# Patient Record
Sex: FEMALE | Race: WHITE | NOT HISPANIC OR LATINO | Employment: FULL TIME | URBAN - METROPOLITAN AREA
[De-identification: names, ages, dates, MRNs, and addresses within clinical notes are randomized per-mention and may not be internally consistent; named-entity substitution may affect disease eponyms.]

---

## 2017-03-08 ENCOUNTER — OFFICE VISIT (OUTPATIENT)
Dept: FAMILY MEDICINE | Facility: CLINIC | Age: 57
End: 2017-03-08
Payer: COMMERCIAL

## 2017-03-08 ENCOUNTER — TELEPHONE (OUTPATIENT)
Dept: FAMILY MEDICINE | Facility: CLINIC | Age: 57
End: 2017-03-08

## 2017-03-08 ENCOUNTER — HOSPITAL ENCOUNTER (OUTPATIENT)
Dept: ULTRASOUND IMAGING | Facility: CLINIC | Age: 57
Discharge: HOME | End: 2017-03-08
Attending: FAMILY MEDICINE | Admitting: FAMILY MEDICINE
Payer: COMMERCIAL

## 2017-03-08 VITALS
TEMPERATURE: 97.6 F | HEIGHT: 67 IN | DIASTOLIC BLOOD PRESSURE: 71 MMHG | WEIGHT: 166 LBS | HEART RATE: 58 BPM | BODY MASS INDEX: 26.06 KG/M2 | SYSTOLIC BLOOD PRESSURE: 125 MMHG

## 2017-03-08 DIAGNOSIS — R10.11 RUQ ABDOMINAL PAIN: ICD-10-CM

## 2017-03-08 DIAGNOSIS — R10.11 RUQ ABDOMINAL PAIN: Primary | ICD-10-CM

## 2017-03-08 LAB
ALBUMIN SERPL-MCNC: 3.8 G/DL (ref 3.4–5)
ALP SERPL-CCNC: 60 U/L (ref 40–150)
ALT SERPL W P-5'-P-CCNC: 36 U/L (ref 0–50)
ANION GAP SERPL CALCULATED.3IONS-SCNC: 6 MMOL/L (ref 3–14)
AST SERPL W P-5'-P-CCNC: 22 U/L (ref 0–45)
BILIRUB SERPL-MCNC: 0.3 MG/DL (ref 0.2–1.3)
BUN SERPL-MCNC: 13 MG/DL (ref 7–30)
CALCIUM SERPL-MCNC: 8.7 MG/DL (ref 8.5–10.1)
CHLORIDE SERPL-SCNC: 104 MMOL/L (ref 94–109)
CO2 SERPL-SCNC: 29 MMOL/L (ref 20–32)
CREAT SERPL-MCNC: 0.63 MG/DL (ref 0.52–1.04)
ERYTHROCYTE [DISTWIDTH] IN BLOOD BY AUTOMATED COUNT: 12.3 % (ref 10–15)
GFR SERPL CREATININE-BSD FRML MDRD: NORMAL ML/MIN/1.7M2
GLUCOSE SERPL-MCNC: 85 MG/DL (ref 70–99)
HCT VFR BLD AUTO: 39.9 % (ref 35–47)
HGB BLD-MCNC: 13.6 G/DL (ref 11.7–15.7)
LIPASE SERPL-CCNC: 173 U/L (ref 73–393)
MCH RBC QN AUTO: 31.1 PG (ref 26.5–33)
MCHC RBC AUTO-ENTMCNC: 34.1 G/DL (ref 31.5–36.5)
MCV RBC AUTO: 91 FL (ref 78–100)
PLATELET # BLD AUTO: 264 10E9/L (ref 150–450)
POTASSIUM SERPL-SCNC: 3.8 MMOL/L (ref 3.4–5.3)
PROT SERPL-MCNC: 7.2 G/DL (ref 6.8–8.8)
RBC # BLD AUTO: 4.37 10E12/L (ref 3.8–5.2)
SODIUM SERPL-SCNC: 139 MMOL/L (ref 133–144)
WBC # BLD AUTO: 8.5 10E9/L (ref 4–11)

## 2017-03-08 PROCEDURE — 85027 COMPLETE CBC AUTOMATED: CPT | Performed by: FAMILY MEDICINE

## 2017-03-08 PROCEDURE — 36415 COLL VENOUS BLD VENIPUNCTURE: CPT | Performed by: FAMILY MEDICINE

## 2017-03-08 PROCEDURE — 83690 ASSAY OF LIPASE: CPT | Performed by: FAMILY MEDICINE

## 2017-03-08 PROCEDURE — 76705 ECHO EXAM OF ABDOMEN: CPT

## 2017-03-08 PROCEDURE — 80053 COMPREHEN METABOLIC PANEL: CPT | Performed by: FAMILY MEDICINE

## 2017-03-08 PROCEDURE — 99214 OFFICE O/P EST MOD 30 MIN: CPT | Performed by: FAMILY MEDICINE

## 2017-03-08 NOTE — PROGRESS NOTES
SUBJECTIVE:                                                    Hannah Moreno is a 56 year old female who presents to clinic today for the following health issues:      Abdominal Pain      Duration: Over one week, about 8 days.     Description (location/character/radiation): Right side abdominal pain, as a dull ache.  It will radiate to the mid abdominal area.  Pain did wake her up at 3:00 am today. It is tender.        Associated flank pain: None    Intensity:  Moderate, 5/10.  Yesterday morning it was 8/10.    Accompanying signs and symptoms:        Fever/Chills: no        Gas/Bloating: YES- Gas and bloating.       Nausea/vomitting: Has felt nauseated.  No vomiting.       Diarrhea: no; the BM's are normal.        Dysuria or Hematuria: no     History (previous similar pain/trauma/previous testing): None    Precipitating or alleviating factors:       Pain worse with eating/BM/urination: An hour to hour and half after eating.  Not worse with a BM or urinating.  Symptoms can be worse with sitting or laying down flat.         Pain relieved by BM: no     Therapies tried and outcome: Apple cider vinegar, Probiotic-increased this, Essential oils, Rolaids. Nothing is working.  Apple cider vinegar seemed to help a little. Bowel movements did not help this.     LMP:  not applicable-hysterectomy.         Current Outpatient Prescriptions:      NEW MED, St Johns Wort-taking daily., Disp: , Rfl:      Cholecalciferol (VITAMIN D3 PO), Take 400 Units by mouth daily, Disp: , Rfl:      atenolol (TENORMIN) 25 MG tablet, Take 0.5 tablets (12.5 mg) by mouth daily Hold on file until needed, Disp: 90 tablet, Rfl: 1     Probiotic Product (PROBIOTIC DAILY PO), Take 1 capsule by mouth 2 times daily., Disp: , Rfl:      Omega-3 Fatty Acids (FISH OIL TRIPLE STRENGTH PO), Take 1 capsule by mouth daily., Disp: , Rfl:      MULTIVITAMIN OR, 2 tabs daily, Disp: , Rfl:     Patient Active Problem List   Diagnosis     Hearing loss     Tachycardia      "Essential hypertension, benign     Persistent disorder of initiating or maintaining sleep     Heat intolerance     CARDIOVASCULAR SCREENING; LDL GOAL LESS THAN 160     S/P ablation operation for arrhythmia     Obesity       Blood pressure 125/71, pulse 58, temperature 97.6  F (36.4  C), temperature source Tympanic, height 5' 7\" (1.702 m), weight 166 lb (75.3 kg), not currently breastfeeding.    Exam:  GENERAL APPEARANCE: healthy, alert and no distress  EYES: EOMI,  PERRL  HENT: ear canals and TM's normal and nose and mouth without ulcers or lesions  NECK: no adenopathy, no asymmetry, masses, or scars and thyroid normal to palpation  RESP: lungs clear to auscultation - no rales, rhonchi or wheezes  CV: regular rates and rhythm, normal S1 S2, no S3 or S4 and no murmur, click or rub -  ABDOMEN: tender only in the RUQ; Villeda's sign is positive. The rest of the abdomen and the bladder and CVA and flanks are not tender.   No masses felt.   SKIN: no suspicious lesions or rashes  PSYCH: mentation appears normal and affect normal/bright      (R10.11) RUQ abdominal pain  (primary encounter diagnosis)  Comment:   Plan: NEW MED, US Abdomen Limited, **CBC with         platelets FUTURE anytime, **Comprehensive         metabolic panel FUTURE anytime, Lipase, GENERAL        SURG ADULT REFERRAL        We discussed the likely cause of the gallstones and use the no fat diet to prevent pain. Do the Ultrasound of the right upper abdomen soon and call [phone].   Do the labs today and we will lashonda the results. The referral to Surgery is done and call [phone] if there are gallstones. If the US is normal then we will order the gallbladder scan. If that is normal then the CT scan of the abdomen with oral and IV contrast and call our RN at [phone] to order this.     Rodney Sheriff              "

## 2017-03-08 NOTE — TELEPHONE ENCOUNTER
Hida scan is placed as per written request.  I have notified the patient to call DI and she has the number. Sharri VALADEZ RN

## 2017-03-08 NOTE — TELEPHONE ENCOUNTER
Reason for Call: Request for an order or referral:    Order or referral being requested: CT or Gallbladder scan      Date needed: as soon as possible    Has the patient been seen by the PCP for this problem? YES    Additional comments: pt is calling stating she was told that if her labs were normal then a Gallbladder scan or CT would be ordered     Phone number Patient can be reached at:  Home number on file [phone] (home)    Best Time:  Any     Can we leave a detailed message on this number?  YES    Call taken on 3/8/2017 at 2:25 PM by Keya Buchanan

## 2017-03-08 NOTE — NURSING NOTE
"Chief Complaint   Patient presents with     Abdominal Pain     Right side abdominal pain for over one week.       Initial /71  Pulse 58  Temp 97.6  F (36.4  C) (Tympanic)  Ht 5' 7\" (1.702 m)  Wt 166 lb (75.3 kg)  BMI 26 kg/m2 Estimated body mass index is 26 kg/(m^2) as calculated from the following:    Height as of this encounter: 5' 7\" (1.702 m).    Weight as of this encounter: 166 lb (75.3 kg).  Medication Reconciliation: complete  "

## 2017-03-08 NOTE — MR AVS SNAPSHOT
After Visit Summary   3/8/2017    Hannah Moreno    MRN: [id number]           Patient Information     Date Of Birth          1960        Visit Information        Provider Department      3/8/2017 8:40 AM Rodney Sheriff MD [facility]        Today's Diagnoses     RUQ abdominal pain    -  1      Care Instructions          Thank you for choosing [facility].  You may be receiving a survey in the mail from [facility] regarding your visit today.  Please take a few minutes to complete and return the survey to let us know how we are doing.      If you have questions or concerns, please contact us via Evrent or you can contact your care team at [phone].    Our Clinic hours are:  Monday 6:40 am  to 7:00 pm  Tuesday -Friday 6:40 am to 5:00 pm    The [place] outpatient lab hours are:  Monday - Friday 6:10 am to 4:45 pm  Saturdays 7:00 am to 11:00 am  Appointments are required, call [phone]    If you have clinical questions after hours or would like to schedule an appointment,  call the clinic at [phone].    (R10.11) RUQ abdominal pain  (primary encounter diagnosis)  Comment:   Plan: NEW MED, US Abdomen Limited, **CBC with         platelets FUTURE anytime, **Comprehensive         metabolic panel FUTURE anytime, Lipase, GENERAL        SURG ADULT REFERRAL        We discussed the likely cause of the gallstones and use the no fat diet to prevent pain. Do the Ultrasound of the right upper abdomen soon and call [phone].   Do the labs today and we will lashonda the results. The referral to Surgery is done and call [phone] if there are gallstones. If the US is normal then we will order the gallbladder scan. If that is normal then the CT scan of the abdomen with oral and IV contrast and call our RN at [phone] to order this.         Follow-ups after your visit        Additional Services     GENERAL SURG ADULT REFERRAL       Your provider has referred you to: YISSEL: Osito Vo  [facility] [phone]   [email or web address]    Please be aware that coverage of these services is subject to the terms and limitations of your health insurance plan.  Call member services at your health plan with any benefit or coverage questions.      Please bring the following with you to your appointment:    (1) Any X-Rays, CTs or MRIs which have been performed.  Contact the facility where they were done to arrange for  prior to your scheduled appointment.   (2) List of current medications   (3) This referral request   (4) Any documents/labs given to you for this referral                  Future tests that were ordered for you today     Open Future Orders        Priority Expected Expires Ordered    **CBC with platelets FUTURE anytime Routine 3/8/2017 3/8/2018 3/8/2017    **Comprehensive metabolic panel FUTURE anytime Routine 3/8/2017 3/8/2018 3/8/2017    US Abdomen Limited Routine  3/8/2018 3/8/2017            Who to contact     If you have questions or need follow up information about today's clinic visit or your schedule please contact [facility] directly at [phone].  Normal or non-critical lab and imaging results will be communicated to you by MyChart, letter or phone within 4 business days after the clinic has received the results. If you do not hear from us within 7 days, please contact the clinic through MyChart or phone. If you have a critical or abnormal lab result, we will notify you by phone as soon as possible.  Submit refill requests through [employer] or call your pharmacy and they will forward the refill request to us. Please allow 3 business days for your refill to be completed.          Additional Information About Your Visit        MyChart Information     [employer] gives you secure access to your electronic health record. If you see a primary care provider, you can also send messages to your care team and make appointments. If you have  "questions, please call your primary care clinic.  If you do not have a primary care provider, please call [phone] and they will assist you.        Care EveryWhere ID     This is your Care EveryWhere ID. This could be used by other organizations to access your [place] medical records  [id number]        Your Vitals Were     Pulse Temperature Height BMI (Body Mass Index)          58 97.6  F (36.4  C) (Tympanic) 5' 7\" (1.702 m) 26 kg/m2         Blood Pressure from Last 3 Encounters:   03/08/17 125/71   08/29/16 108/80   11/11/15 104/64    Weight from Last 3 Encounters:   03/08/17 166 lb (75.3 kg)   08/29/16 179 lb 9.6 oz (81.5 kg)   11/11/15 228 lb (103.4 kg)              We Performed the Following     GENERAL SURG ADULT REFERRAL     Lipase        Primary Care Provider Office Phone # Fax #    Donnell Buchanan [phone]       [facility] [address] [facility] [address]        Thank you!     Thank you for choosing [facility]  for your care. Our goal is always to provide you with excellent care. Hearing back from our patients is one way we can continue to improve our services. Please take a few minutes to complete the written survey that you may receive in the mail after your visit with us. Thank you!             Your Updated Medication List - Protect others around you: Learn how to safely use, store and throw away your medicines at www.disposemymeds.org.          This list is accurate as of: 3/8/17  9:20 AM.  Always use your most recent med list.                   Brand Name Dispense Instructions for use    atenolol 25 MG tablet    TENORMIN    90 tablet    Take 0.5 tablets (12.5 mg) by mouth daily Hold on file until needed       FISH OIL TRIPLE STRENGTH PO      Take 1 capsule by mouth daily.       MULTIVITAMIN PO      2 tabs daily       NEW MED      St Johns Wort-taking daily.       PROBIOTIC DAILY PO      Take 1 capsule by mouth 2 times daily.       VITAMIN D3 " PO      Take 400 Units by mouth daily

## 2017-03-08 NOTE — PATIENT INSTRUCTIONS
Thank you for choosing [facility].  You may be receiving a survey in the mail from [facility] regarding your visit today.  Please take a few minutes to complete and return the survey to let us know how we are doing.      If you have questions or concerns, please contact us via [employer] or you can contact your care team at [phone].    Our Clinic hours are:  Monday 6:40 am  to 7:00 pm  Tuesday -Friday 6:40 am to 5:00 pm    The [place] outpatient lab hours are:  Monday - Friday 6:10 am to 4:45 pm  Saturdays 7:00 am to 11:00 am  Appointments are required, call [phone]    If you have clinical questions after hours or would like to schedule an appointment,  call the clinic at [phone].    (R10.11) RUQ abdominal pain  (primary encounter diagnosis)  Comment:   Plan: NEW MED, US Abdomen Limited, **CBC with         platelets FUTURE anytime, **Comprehensive         metabolic panel FUTURE anytime, Lipase, GENERAL        SURG ADULT REFERRAL        We discussed the likely cause of the gallstones and use the no fat diet to prevent pain. Do the Ultrasound of the right upper abdomen soon and call [phone].   Do the labs today and we will lashonda the results. The referral to Surgery is done and call [phone] if there are gallstones. If the US is normal then we will order the gallbladder scan. If that is normal then the CT scan of the abdomen with oral and IV contrast and call our RN at [phone] to order this.

## 2017-03-10 ENCOUNTER — HOSPITAL ENCOUNTER (OUTPATIENT)
Dept: NUCLEAR MEDICINE | Facility: CLINIC | Age: 57
Setting detail: NUCLEAR MEDICINE
Discharge: HOME | End: 2017-03-10
Attending: FAMILY MEDICINE | Admitting: FAMILY MEDICINE
Payer: COMMERCIAL

## 2017-03-10 DIAGNOSIS — R10.11 RUQ ABDOMINAL PAIN: ICD-10-CM

## 2017-03-10 PROCEDURE — A9537 TC99M MEBROFENIN: HCPCS | Performed by: FAMILY MEDICINE

## 2017-03-10 PROCEDURE — 78227 HEPATOBIL SYST IMAGE W/DRUG: CPT

## 2017-03-10 PROCEDURE — 34300033 ZZH RX 343: Performed by: FAMILY MEDICINE

## 2017-03-10 RX ORDER — KIT FOR THE PREPARATION OF TECHNETIUM TC 99M MEBROFENIN 45 MG/10ML
4.8-7.2 INJECTION, POWDER, LYOPHILIZED, FOR SOLUTION INTRAVENOUS ONCE
Status: COMPLETED | OUTPATIENT
Start: 2017-03-10 | End: 2017-03-10

## 2017-03-10 RX ADMIN — MEBROFENIN 5 MCI.: 45 INJECTION, POWDER, LYOPHILIZED, FOR SOLUTION INTRAVENOUS at 10:04

## 2017-03-13 ENCOUNTER — MYC MEDICAL ADVICE (OUTPATIENT)
Dept: FAMILY MEDICINE | Facility: CLINIC | Age: 57
End: 2017-03-13

## 2017-03-13 ENCOUNTER — HOSPITAL ENCOUNTER (OUTPATIENT)
Dept: CT IMAGING | Facility: CLINIC | Age: 57
Discharge: HOME | End: 2017-03-13
Attending: FAMILY MEDICINE | Admitting: FAMILY MEDICINE
Payer: COMMERCIAL

## 2017-03-13 ENCOUNTER — TELEPHONE (OUTPATIENT)
Dept: FAMILY MEDICINE | Facility: CLINIC | Age: 57
End: 2017-03-13

## 2017-03-13 DIAGNOSIS — R10.11 ABDOMINAL PAIN, RIGHT UPPER QUADRANT: Primary | ICD-10-CM

## 2017-03-13 DIAGNOSIS — R10.11 ABDOMINAL PAIN, RIGHT UPPER QUADRANT: ICD-10-CM

## 2017-03-13 PROCEDURE — 25000125 ZZHC RX 250: Performed by: RADIOLOGY

## 2017-03-13 PROCEDURE — 74177 CT ABD & PELVIS W/CONTRAST: CPT

## 2017-03-13 PROCEDURE — 25500064 ZZH RX 255 OP 636: Performed by: RADIOLOGY

## 2017-03-13 RX ORDER — IOPAMIDOL 755 MG/ML
81 INJECTION, SOLUTION INTRAVASCULAR ONCE
Status: COMPLETED | OUTPATIENT
Start: 2017-03-13 | End: 2017-03-13

## 2017-03-13 RX ADMIN — IOPAMIDOL 81 ML: 755 INJECTION, SOLUTION INTRAVENOUS at 15:52

## 2017-03-13 RX ADMIN — SODIUM CHLORIDE 60 ML: 9 INJECTION, SOLUTION INTRAVENOUS at 15:51

## 2017-03-13 NOTE — TELEPHONE ENCOUNTER
Patient reports her gallbladder scan was negative, she wants to know if CT scan is next.  Please Advise.    Order pended.    Routing to provider.    Alejandrina WELCH Rn

## 2017-03-13 NOTE — TELEPHONE ENCOUNTER
Reason for Call:  Other call back    Detailed comments: She had a gallbladder scan done today.  That was negative.  She is wondering if she is suppose to have a CT Scan done now?  Please advise.    Phone Number Patient can be reached at: Home number on file [phone] (home)    Best Time: any    Can we leave a detailed message on this number? YES    Call taken on 3/13/2017 at 11:25 AM by Noemi Farley

## 2017-03-13 NOTE — TELEPHONE ENCOUNTER
Contacted patient via phone.  Notified her of Dr. Sheriff's order for CT and gave her number for Imaging to schedule her appt.  Telephone encounter made.    Alejandrina WELCH Rn

## 2017-03-13 NOTE — TELEPHONE ENCOUNTER
Please see message below.  Advise.    Order pended.    Routing to provider.    Alejandrina WELCH Rn

## 2017-03-15 ENCOUNTER — MYC MEDICAL ADVICE (OUTPATIENT)
Dept: FAMILY MEDICINE | Facility: CLINIC | Age: 57
End: 2017-03-15

## 2017-03-15 DIAGNOSIS — R10.11 ABDOMINAL PAIN, RIGHT UPPER QUADRANT: Primary | ICD-10-CM

## 2017-03-15 NOTE — TELEPHONE ENCOUNTER
These are ordered. Please help her get the stool container.   She should make the lab appt. We will call the results.   ]Rodney Sheriff

## 2017-03-15 NOTE — TELEPHONE ENCOUNTER
Dr. Sheriff- patient is requesting to  Have the H. Pylori test completed here before she goes to see the GI specialist. Are you ok ordering this? She is also requesting to complete the Hep C screening at that time    Ofelia Trejo RN

## 2017-04-07 DIAGNOSIS — I10 ESSENTIAL HYPERTENSION, BENIGN: ICD-10-CM

## 2017-04-07 DIAGNOSIS — R00.0 TACHYCARDIA, UNSPECIFIED: ICD-10-CM

## 2017-04-07 NOTE — TELEPHONE ENCOUNTER
Atenolol      Last Written Prescription Date: 08/29/16  Last Fill Quantity: 90, # refills: 1    Last Office Visit with [facility], [facility] or [facility] prescribing provider:  03/08/17   Future Office Visit:        BP Readings from Last 3 Encounters:   03/08/17 125/71   08/29/16 108/80   11/11/15 104/64

## 2017-04-11 RX ORDER — ATENOLOL 25 MG/1
TABLET ORAL
Qty: 90 TABLET | Refills: 1 | Status: SHIPPED | OUTPATIENT
Start: 2017-04-11 | End: 2017-05-30 | Stop reason: DRUGHIGH

## 2017-05-30 ENCOUNTER — OFFICE VISIT (OUTPATIENT)
Dept: FAMILY MEDICINE | Facility: CLINIC | Age: 57
End: 2017-05-30
Payer: COMMERCIAL

## 2017-05-30 VITALS
DIASTOLIC BLOOD PRESSURE: 76 MMHG | BODY MASS INDEX: 27.94 KG/M2 | SYSTOLIC BLOOD PRESSURE: 114 MMHG | HEIGHT: 67 IN | HEART RATE: 60 BPM | WEIGHT: 178 LBS | TEMPERATURE: 97.8 F

## 2017-05-30 DIAGNOSIS — Z11.59 NEED FOR HEPATITIS C SCREENING TEST: ICD-10-CM

## 2017-05-30 DIAGNOSIS — L82.0 INFLAMED SEBORRHEIC KERATOSIS: Primary | ICD-10-CM

## 2017-05-30 LAB — HCV AB SERPL QL IA: NORMAL

## 2017-05-30 PROCEDURE — 36415 COLL VENOUS BLD VENIPUNCTURE: CPT | Performed by: FAMILY MEDICINE

## 2017-05-30 PROCEDURE — 86803 HEPATITIS C AB TEST: CPT | Performed by: FAMILY MEDICINE

## 2017-05-30 PROCEDURE — 99213 OFFICE O/P EST LOW 20 MIN: CPT | Performed by: FAMILY MEDICINE

## 2017-05-30 NOTE — PATIENT INSTRUCTIONS
Please go to lab.    You have seborrheic keratosis on your abdomen.    They are benign.    If they bother you I would treat them with liquid nitrogen.          Thank you for choosing [facility].  You may be receiving a survey in the mail from [facility] regarding your visit today.  Please take a few minutes to complete and return the survey to let us know how we are doing.      If you have questions or concerns, please contact us via [employer] or you can contact your care team at [phone].    Our Clinic hours are:  Monday 6:40 am  to 7:00 pm  Tuesday -Friday 6:40 am to 5:00 pm    The [place] outpatient lab hours are:  Monday - Friday 6:10 am to 4:45 pm  Saturdays 7:00 am to 11:00 am  Appointments are required, call [phone]    If you have clinical questions after hours or would like to schedule an appointment,  call the clinic at [phone].

## 2017-05-30 NOTE — PROGRESS NOTES
"  SUBJECTIVE:                                                    Hannah Moreno is a 56 year old female who presents to clinic today for the following health issues:  Chief Complaint   Patient presents with     Mole     2 moles on her abdomin that have changed color and texture. Also has a mole on her left arm that has gotten larger.     The moles have changed color from brown, black and now gray.  She has similar one that she has scrapped off.  Has h/o SK on her back.        Problem list and histories reviewed & adjusted, as indicated.  Additional history: as documented        Reviewed and updated as needed this visit by clinical staff  Tobacco  Allergies  Meds  Med Hx  Surg Hx  Fam Hx  Soc Hx      Reviewed and updated as needed this visit by Provider         ROS:  CONSTITUTIONAL:NEGATIVE for fever, chills, change in weight  INTEGUMENTARY/SKIN: as above    OBJECTIVE:                                                    /76 (Cuff Size: Adult Large)  Pulse 60  Temp 97.8  F (36.6  C) (Tympanic)  Ht 5' 7\" (1.702 m)  Wt 178 lb (80.7 kg)  BMI 27.88 kg/m2  Body mass index is 27.88 kg/(m^2).  GENERAL APPEARANCE: healthy, alert and no distress  SKIN: plaque - abdomen, two that are about 3 mm diameter and 4 mm diameter, brown gray in color, c/w SK.  Mild irritation at waist line of abdomen         ASSESSMENT/PLAN:                                                    1. Inflamed seborrheic keratosis  Explained what they are and treatment if she would like them done    2. Need for hepatitis C screening test  Needs to have this screened  - Hepatitis C antibody    Counseling/Coordination of care is over 10 min in 15 min appt.    See Patient Instructions    Donnell Buchanan MD  [facility]  "

## 2017-05-30 NOTE — NURSING NOTE
"Chief Complaint   Patient presents with     Mole     2 moles on her abdomin that have changed color and texture. Also has a mole on her left arm that has gotten larger.       Initial /76 (Cuff Size: Adult Large)  Pulse 60  Temp 97.8  F (36.6  C) (Tympanic)  Ht 5' 7\" (1.702 m)  Wt 178 lb (80.7 kg)  BMI 27.88 kg/m2 Estimated body mass index is 27.88 kg/(m^2) as calculated from the following:    Height as of this encounter: 5' 7\" (1.702 m).    Weight as of this encounter: 178 lb (80.7 kg).  Medication Reconciliation: mariola Simpson, AR-C (AAMA)  "

## 2017-05-30 NOTE — MR AVS SNAPSHOT
After Visit Summary   5/30/2017    Hannah Moreno    MRN: [id number]           Patient Information     Date Of Birth          1960        Visit Information        Provider Department      5/30/2017 8:20 AM Donnell Buchanan MD [facility]        Today's Diagnoses     Inflamed seborrheic keratosis    -  1    Need for hepatitis C screening test          Care Instructions    Please go to lab.    You have seborrheic keratosis on your abdomen.    They are benign.    If they bother you I would treat them with liquid nitrogen.          Thank you for choosing [facility].  You may be receiving a survey in the mail from [facility] regarding your visit today.  Please take a few minutes to complete and return the survey to let us know how we are doing.      If you have questions or concerns, please contact us via [employer] or you can contact your care team at [phone].    Our Clinic hours are:  Monday 6:40 am  to 7:00 pm  Tuesday -Friday 6:40 am to 5:00 pm    The [place] outpatient lab hours are:  Monday - Friday 6:10 am to 4:45 pm  Saturdays 7:00 am to 11:00 am  Appointments are required, call [phone]    If you have clinical questions after hours or would like to schedule an appointment,  call the clinic at [phone].              Follow-ups after your visit        Who to contact     If you have questions or need follow up information about today's clinic visit or your schedule please contact [facility] directly at [phone].  Normal or non-critical lab and imaging results will be communicated to you by [employer], letter or phone within 4 business days after the clinic has received the results. If you do not hear from us within 7 days, please contact the clinic through Emp[employer] or phone. If you have a critical or abnormal lab result, we will notify you by phone as soon as possible.  Submit refill requests through [employer] or call your pharmacy and they will  "forward the refill request to us. Please allow 3 business days for your refill to be completed.          Additional Information About Your Visit        [employer] Information     [employer] gives you secure access to your electronic health record. If you see a primary care provider, you can also send messages to your care team and make appointments. If you have questions, please call your primary care clinic.  If you do not have a primary care provider, please call [phone] and they will assist you.        Care EveryWhere ID     This is your Care EveryWhere ID. This could be used by other organizations to access your [place] medical records  [id number]        Your Vitals Were     Pulse Temperature Height BMI (Body Mass Index)          60 97.8  F (36.6  C) (Tympanic) 5' 7\" (1.702 m) 27.88 kg/m2         Blood Pressure from Last 3 Encounters:   05/30/17 114/76   03/08/17 125/71   08/29/16 108/80    Weight from Last 3 Encounters:   05/30/17 178 lb (80.7 kg)   03/08/17 166 lb (75.3 kg)   08/29/16 179 lb 9.6 oz (81.5 kg)              We Performed the Following     Hepatitis C antibody          Today's Medication Changes          These changes are accurate as of: 5/30/17  8:53 AM.  If you have any questions, ask your nurse or doctor.               These medicines have changed or have updated prescriptions.        Dose/Directions    atenolol 25 MG tablet   Commonly known as:  TENORMIN   This may have changed:  Another medication with the same name was removed. Continue taking this medication, and follow the directions you see here.   Used for:  Tachycardia, unspecified, Essential hypertension, benign   Changed by:  Donnell Buchanan MD        Dose:  12.5 mg   Take 0.5 tablets (12.5 mg) by mouth daily Hold on file until needed   Quantity:  90 tablet   Refills:  1                Primary Care Provider Office Phone # Fax #    Donnell Buchanan [phone]       [facility] [address] [facility] [address]        Thank you!     Thank you for choosing [facility]  for your care. Our goal is always to provide you with excellent care. Hearing back from our patients is one way we can continue to improve our services. Please take a few minutes to complete the written survey that you may receive in the mail after your visit with us. Thank you!             Your Updated Medication List - Protect others around you: Learn how to safely use, store and throw away your medicines at www.disposemymeds.org.          This list is accurate as of: 5/30/17  8:53 AM.  Always use your most recent med list.                   Brand Name Dispense Instructions for use    atenolol 25 MG tablet    TENORMIN    90 tablet    Take 0.5 tablets (12.5 mg) by mouth daily Hold on file until needed       FISH OIL TRIPLE STRENGTH PO      Take 1 capsule by mouth daily.       MULTIVITAMIN PO      2 tabs daily       NEW MED      St Johns Wort-taking daily.       PROBIOTIC DAILY PO      Take 1 capsule by mouth 2 times daily.       Turmeric Curcumin Caps      Take 1 tablet by mouth daily       VITAMIN D3 PO      Take 400 Units by mouth daily

## 2017-08-10 ENCOUNTER — OFFICE VISIT (OUTPATIENT)
Dept: FAMILY MEDICINE | Facility: CLINIC | Age: 57
End: 2017-08-10
Payer: COMMERCIAL

## 2017-08-10 VITALS
DIASTOLIC BLOOD PRESSURE: 65 MMHG | RESPIRATION RATE: 15 BRPM | HEART RATE: 78 BPM | TEMPERATURE: 96.9 F | BODY MASS INDEX: 29.13 KG/M2 | WEIGHT: 186 LBS | OXYGEN SATURATION: 98 % | SYSTOLIC BLOOD PRESSURE: 123 MMHG

## 2017-08-10 DIAGNOSIS — S46.811A STRAIN OF TRAPEZIUS MUSCLE, RIGHT, INITIAL ENCOUNTER: Primary | ICD-10-CM

## 2017-08-10 DIAGNOSIS — M77.8 SHOULDER TENDINITIS, RIGHT: ICD-10-CM

## 2017-08-10 PROCEDURE — 99213 OFFICE O/P EST LOW 20 MIN: CPT | Performed by: FAMILY MEDICINE

## 2017-08-10 RX ORDER — CYCLOBENZAPRINE HCL 5 MG
TABLET ORAL
Qty: 90 TABLET | Refills: 0 | Status: SHIPPED | OUTPATIENT
Start: 2017-08-10 | End: 2018-07-27

## 2017-08-10 NOTE — LETTER
[facility]  [address] [facility] [address]  Phone: [phone]    August 10, 2017        Hannah Moreno  [address]  [place] [address]          To whom it may concern:    RE: Hannah Moreno    Patient was seen and treated today at our clinic.  Recommend left handed work mostly until re-evaluated with frequent breaks.   Recommend workman's comp evaluation.     Please contact me for questions or concerns.      Sincerely,        Nereida Galo MD

## 2017-08-10 NOTE — MR AVS SNAPSHOT
After Visit Summary   8/10/2017    Hannah Moreno    MRN: [id number]           Patient Information     Date Of Birth          1960        Visit Information        Provider Department      8/10/2017 3:20 PM Nereida Galo MD [facility]        Today's Diagnoses     Strain of trapezius muscle, right, initial encounter    -  1    Shoulder tendinitis, right           Follow-ups after your visit        Who to contact     If you have questions or need follow up information about today's clinic visit or your schedule please contact [facility] directly at [phone].  Normal or non-critical lab and imaging results will be communicated to you by [employer], letter or phone within 4 business days after the clinic has received the results. If you do not hear from us within 7 days, please contact the clinic through [email or web address] or phone. If you have a critical or abnormal lab result, we will notify you by phone as soon as possible.  Submit refill requests through InHiro or call your pharmacy and they will forward the refill request to us. Please allow 3 business days for your refill to be completed.          Additional Information About Your Visit        MyChart Information     InHiro gives you secure access to your electronic health record. If you see a primary care provider, you can also send messages to your care team and make appointments. If you have questions, please call your primary care clinic.  If you do not have a primary care provider, please call [phone] and they will assist you.        Care EveryWhere ID     This is your Care EveryWhere ID. This could be used by other organizations to access your [place] medical records  [id number]        Your Vitals Were     Pulse Temperature Respirations Pulse Oximetry Breastfeeding? BMI (Body Mass Index)    78 96.9  F (36.1  C) (Oral) 15 98% No 29.13 kg/m2       Blood Pressure from Last 3 Encounters:   08/10/17 123/65    05/30/17 114/76   03/08/17 125/71    Weight from Last 3 Encounters:   08/10/17 186 lb (84.4 kg)   05/30/17 178 lb (80.7 kg)   03/08/17 166 lb (75.3 kg)              Today, you had the following     No orders found for display         Today's Medication Changes          These changes are accurate as of: 8/10/17  5:13 PM.  If you have any questions, ask your nurse or doctor.               Start taking these medicines.        Dose/Directions    cyclobenzaprine 5 MG tablet   Commonly known as:  FLEXERIL   Used for:  Strain of trapezius muscle, right, initial encounter   Started by:  Nereida Galo MD        May take 1 or 2 tablets 3 times a day as needed for muscle spasm and pain. Sedating. Preferably take at bedtime   Quantity:  90 tablet   Refills:  0            Where to get your medicines      These medications were sent to [facility] [id number] [facility], [place] - [address]  [address], [facility] [address]     Phone:  [phone]     cyclobenzaprine 5 MG tablet                Primary Care Provider Office Phone # Fax #    Donnell Buchanan [phone] [address] [facility] [address]        Equal Access to Services     VON OHARA : Hadii mery ku hadasho Soomaali, waaxda luqadaha, qaybta kaalmada adeegyada, waxay toroin haynaima redding. So [facility] [phone].    ATENCIÓN: Si habla español, tiene a doll disposición servicios gratuitos de asistencia lingüística. Llame al [phone].    We comply with applicable federal civil rights laws and [place] laws. We do not discriminate on the basis of race, color, national origin, age, disability sex, sexual orientation or gender identity.            Thank you!     [facility]. Our goal is always to provide you with excellent care. Hearing back from our patients is one way we can continue to improve our services. Please take a few minutes to complete the written  survey that you may receive in the mail after your visit with us. Thank you!             Your Updated Medication List - Protect others around you: Learn how to safely use, store and throw away your medicines at www.disposemymeds.org.          This list is accurate as of: 8/10/17  5:13 PM.  Always use your most recent med list.                   Brand Name Dispense Instructions for use Diagnosis    atenolol 25 MG tablet    TENORMIN    90 tablet    Take 0.5 tablets (12.5 mg) by mouth daily Hold on file until needed    Tachycardia, unspecified, Essential hypertension, benign       cyclobenzaprine 5 MG tablet    FLEXERIL    90 tablet    May take 1 or 2 tablets 3 times a day as needed for muscle spasm and pain. Sedating. Preferably take at bedtime    Strain of trapezius muscle, right, initial encounter       FISH OIL TRIPLE STRENGTH PO      Take 1 capsule by mouth daily.        MULTIVITAMIN PO      2 tabs daily        NEW MED      St Johns Wort-taking daily.    RUQ abdominal pain       PROBIOTIC DAILY PO      Take 1 capsule by mouth 2 times daily.        Turmeric Curcumin Caps      Take 1 tablet by mouth daily        VITAMIN D3 PO      Take 400 Units by mouth daily

## 2017-08-10 NOTE — PROGRESS NOTES
SUBJECTIVE:                                                    Hannah Moreno is a 56 year old female who presents to clinic today for the following health issues:      Musculoskeletal problem/pain      Duration: 8/5/17    Description  Location: right shoulder    Intensity:  moderate    Accompanying signs and symptoms: tingling    History  Previous similar problem: no   Previous evaluation:  none    Precipitating or alleviating factors:  Trauma or overuse: no   Aggravating factors include: none    Therapies tried and outcome: heat, ice, massage and Ibuprofen    Woke up 5 days ago with right shoulder pain right in the shoulder joint  Worse in the back and the scapula some tension    shes had a month ago went away 2 days    Thought maybe overuse because she's   She was exerting herself and was under stress  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    Limited mobility  This mornign woke up at 3 am and it popped.  Maybe felt a little bit.    Sharp pain.  No numbness or tingling  But does sometimes shoot down the arm  Worse with movement relieved by rest  Worse with certain positions.  No fevers or chills chest pain or shortness of breath   No orthopnea pnd or edema     Problem list, Medication list, Allergies, and Medical/Social/Surgical histories reviewed in EPIC and updated as appropriate.        REVIEW OF SYSTEMS  General: negative for fever, constitutional symptoms or weight loss  Resp: negative for chest pain or shortness of breath  CV: negative for chest pain  : negative for dysuria , incontinence, frequency  Musculoskeletal: as above  Neurologic: negative for ataxia, saddle anesthesia, fecal or urinary incontinence, one  sided weakness,  Paresthesias  Constitutional, HEENT, cardiovascular, pulmonary, gi and gu systems are negative, except as otherwise noted.    Physical Exam:  Vitals: /65  Pulse 78  Temp 96.9  F (36.1  C) (Oral)  Resp 15  Wt 186 lb (84.4 kg)  SpO2 98%  Breastfeeding? No  BMI 29.13 kg/m2  BMI= Body mass index is 29.13 kg/(m^2).  Constitutional: healthy, alert and no acute distress   Head: atraumatic  Eyes: anicteric  CARDIO: regular in rate and rhythm no murmurs rubs or gallops  RESP: lungs clear to auscultation  Musculoskeletal: Nocervical spine tenderness   YES trapezius and levator muscle spasm on the right    Increased pain  In range of motion with external rotation of shoulder and forward flexion  Right shoulder: tender biceps tendon as well. But negative speed's test. Negative empty can test. Rest of range of motion good.   No neurologic deficits. No sensory deficits or motor deficits both upper and lower extremity   GAIT: intact  Psychiatric: mentation appears normal and affect normal/bright      Impression:     ICD-10-CM    1. Strain of trapezius muscle, right, initial encounter S46.811A cyclobenzaprine (FLEXERIL) 5 MG tablet   2. Shoulder tendinitis, right M75.81          Plan:    Prescribed with flexeril. Warned sedating  Sedating medications given. Aware not to drive or operate machinery while on these medications. Caution with .    Suspect strain and tendinitis  Possibly work related, worse with range of motion of usual tasks at work, (moving mouse key, keyboarding)  Work note with restrictions and recommend follow up with occupational health.   activity modifications advised.  Over the counter medications discussed. Patient aware to avoid NSAIDS if with any kidney disease or ulcers. Proper dosing of over the counter medications likewise discussed.  Adverse reaction to medication discussed.  aware to come in right away if with any fever or chills, worsening symptoms, headache, bowel  or bladder incontinence, motor or sensory deficits or gait disturbances.   follow-up recommended.      Nereida Galo MD

## 2017-11-08 DIAGNOSIS — I10 ESSENTIAL HYPERTENSION, BENIGN: ICD-10-CM

## 2017-11-08 DIAGNOSIS — R00.0 TACHYCARDIA: ICD-10-CM

## 2017-11-13 NOTE — TELEPHONE ENCOUNTER
Dr. Buchanan,   Per chart review, it looks like the Atenolol was re-ordered at 25 mg on 4-11-17 and then discontinued due to dose adjustment on 5-30-17. Last dosage prescribed was 12.5 mg by PCP on 8-29-16.  Please verify dosage on refill. B/P are at goal.   EMERY Pena

## 2017-11-15 ENCOUNTER — MYC MEDICAL ADVICE (OUTPATIENT)
Dept: FAMILY MEDICINE | Facility: CLINIC | Age: 57
End: 2017-11-15

## 2017-11-15 RX ORDER — ATENOLOL 25 MG/1
TABLET ORAL
Qty: 90 TABLET | Refills: 0 | Status: SHIPPED | OUTPATIENT
Start: 2017-11-15 | End: 2018-06-27

## 2017-11-15 NOTE — TELEPHONE ENCOUNTER
atenolol (TENORMIN) 25 MG tablet [Pharmacy Med Name: ATENOLOL 25MG       TAB]   11/13/2017  8:56 AM        Blood pressure under 140/90        Patient is age 6 or older        Recent or future visit with authorizing provider's specialty   Patient sent a mychart note that she is almost out of pills(pharm gave her emergency supply) and is needing this.   I did send her a mychart note back asking her to clarify her current dose.   Chart med list says:   atenolol (TENORMIN) 25 MG tablet 90 tablet 1 8/29/2016     Sig: Take 0.5 tablets (12.5 mg) by mouth daily      Routing refill request to provider for review/approval because:  A break in medication  Dose question/ discrepency. I am asking patient to clarify what dose she has currently been taking in a mychart note.  Chitra Rosado RNC

## 2018-04-02 ENCOUNTER — OFFICE VISIT (OUTPATIENT)
Dept: FAMILY MEDICINE | Facility: CLINIC | Age: 58
End: 2018-04-02
Payer: COMMERCIAL

## 2018-04-02 VITALS
TEMPERATURE: 97.4 F | SYSTOLIC BLOOD PRESSURE: 130 MMHG | BODY MASS INDEX: 32.77 KG/M2 | DIASTOLIC BLOOD PRESSURE: 78 MMHG | HEIGHT: 67 IN | WEIGHT: 208.8 LBS | RESPIRATION RATE: 14 BRPM | HEART RATE: 56 BPM

## 2018-04-02 DIAGNOSIS — Z12.31 ENCOUNTER FOR SCREENING MAMMOGRAM FOR BREAST CANCER: ICD-10-CM

## 2018-04-02 DIAGNOSIS — L72.9 CYST OF SKIN: Primary | ICD-10-CM

## 2018-04-02 PROCEDURE — 99213 OFFICE O/P EST LOW 20 MIN: CPT | Performed by: NURSE PRACTITIONER

## 2018-04-02 RX ORDER — CLINDAMYCIN HCL 150 MG
150 CAPSULE ORAL 3 TIMES DAILY
Qty: 30 CAPSULE | Refills: 0 | Status: SHIPPED | OUTPATIENT
Start: 2018-04-02 | End: 2018-07-27

## 2018-04-02 ASSESSMENT — ENCOUNTER SYMPTOMS
RHINORRHEA: 0
MUSCULOSKELETAL NEGATIVE: 1
DIAPHORESIS: 0
WHEEZING: 0
EYE ITCHING: 0
HEADACHES: 0
DIZZINESS: 0
SHORTNESS OF BREATH: 0
CONSTIPATION: 0
LIGHT-HEADEDNESS: 0
SORE THROAT: 0
DIARRHEA: 0
EYE DISCHARGE: 0
FEVER: 0
CHILLS: 0
FATIGUE: 0
COUGH: 0
NAUSEA: 0
SINUS PRESSURE: 0
PALPITATIONS: 0
VOMITING: 0
ABDOMINAL PAIN: 0

## 2018-04-02 ASSESSMENT — PAIN SCALES - GENERAL: PAINLEVEL: MILD PAIN (2)

## 2018-04-02 NOTE — PROGRESS NOTES
SUBJECTIVE:   Hannah Moreno is a 57 year old female who presents to clinic today for the following health issues:      Chief Complaint   Patient presents with     Breast Mass     Found lump under left breast one week ago. Lump is painful and red with a black center. Seems to be getting bigger.          Problem list and histories reviewed & adjusted, as indicated.  Additional history: as documented    Current Outpatient Prescriptions   Medication Sig Dispense Refill     clindamycin (CLEOCIN) 150 MG capsule Take 1 capsule (150 mg) by mouth 3 times daily 30 capsule 0     atenolol (TENORMIN) 25 MG tablet TAKE ONE-HALF TABLET BY MOUTH ONCE DAILY 90 tablet 0     WebTeb Wort-taking daily.       MULTIVITAMIN OR 2 tabs daily       cyclobenzaprine (FLEXERIL) 5 MG tablet May take 1 or 2 tablets 3 times a day as needed for muscle spasm and pain. Sedating. Preferably take at bedtime (Patient not taking: Reported on 4/2/2018) 90 tablet 0     Misc Natural Products (TURMERIC CURCUMIN) CAPS Take 1 tablet by mouth daily       Cholecalciferol (VITAMIN D3 PO) Take 400 Units by mouth daily       Probiotic Product (PROBIOTIC DAILY PO) Take 1 capsule by mouth 2 times daily.       Omega-3 Fatty Acids (FISH OIL TRIPLE STRENGTH PO) Take 1 capsule by mouth daily.       Allergies   Allergen Reactions     Amoxicillin Hives     Sulfa Drugs Hives       Reviewed and updated as needed this visit by clinical staff  Tobacco  Allergies  Meds  Med Hx  Surg Hx  Fam Hx  Soc Hx      Reviewed and updated as needed this visit by Provider         Spot under left breast for the last week Does hurt and is red. Was the size of pea and now is some bigger. Has been putting hot packs on it for 3-4 nights and that did not help. Is also using some oils and that is not helping. No fevers or chills. No drainage from area.     ROS:  Review of Systems   Constitutional: Negative for chills, diaphoresis, fatigue and fever.   HENT: Negative for ear  "discharge, ear pain, hearing loss, rhinorrhea, sinus pressure and sore throat.    Eyes: Negative for discharge and itching.   Respiratory: Negative for cough, shortness of breath and wheezing.    Cardiovascular: Negative for chest pain and palpitations.   Gastrointestinal: Negative for abdominal pain, constipation, diarrhea, nausea and vomiting.   Musculoskeletal: Negative.    Skin: Negative for rash.        Lump on skin under left breast   Neurological: Negative for dizziness, light-headedness and headaches.         OBJECTIVE:     /78 (BP Location: Left arm, Patient Position: Sitting, Cuff Size: Adult Large)  Pulse 56  Temp 97.4  F (36.3  C) (Tympanic)  Resp 14  Ht 5' 7\" (1.702 m)  Wt 208 lb 12.8 oz (94.7 kg)  BMI 32.7 kg/m2  Body mass index is 32.7 kg/(m^2).  Physical Exam   Constitutional: She appears well-developed and well-nourished. She is cooperative. No distress.   Cardiovascular: Normal rate, regular rhythm, S1 normal, S2 normal and normal heart sounds.  Exam reveals no gallop and no friction rub.    No murmur heard.  Pulmonary/Chest: Effort normal and breath sounds normal. No accessory muscle usage. No respiratory distress.   Neurological: She is alert.   Skin: Skin is warm and dry.              ASSESSMENT/PLAN:     1. Cyst of skin  Expressed think yellow pus/blood from cyst.   Continue to warm pack area 2-3 x/day for 10-20 minutes.  Start Clindamycin.  Encouraged to eat yogurt to prevent diarrhea.  Return to clinic if area of redness grows or you develop a fever greater than 101.  - clindamycin (CLEOCIN) 150 MG capsule; Take 1 capsule (150 mg) by mouth 3 times daily  Dispense: 30 capsule; Refill: 0    2. Encounter for screening mammogram for breast cancer  Order placed.  Patient to schedule test.   - MA Screening Digital Bilateral; Future    Annie Batista RN [employer] Student Nurse Practitioner     I agree with the PFSH and ROS as completed by the NP student. The remainder of the encounter was " performed by me and scribed the NP student. The scribed note accurately reflects my personal services and the decisions made by me. KAIT Mayes, NP-C      [facility]

## 2018-04-02 NOTE — MR AVS SNAPSHOT
After Visit Summary   4/2/2018    Hannah Moreno    MRN: [id number]           Patient Information     Date Of Birth          1960        Visit Information        Provider Department      4/2/2018 2:00 PM Jessica Lemus APRN [facility]        Today's Diagnoses     Cyst of skin    -  1    Encounter for screening mammogram for breast cancer           Follow-ups after your visit        Future tests that were ordered for you today     Open Future Orders        Priority Expected Expires Ordered    MA Screening Digital Bilateral Routine  4/2/2019 4/2/2018            Who to contact     Normal or non-critical lab and imaging results will be communicated to you by [employer] Network[employer], letter or phone within 4 business days after the clinic has received the results. If you do not hear from us within 7 days, please contact the clinic through [employer] Network[employer] or phone. If you have a critical or abnormal lab result, we will notify you by phone as soon as possible.  Submit refill requests through Skytap or call your pharmacy and they will forward the refill request to us. Please allow 3 business days for your refill to be completed.          If you need to speak with a  for additional information , please call: [phone]           Additional Information About Your Visit        Red[employer] Networkharqunb Information     Sk[employer]ap gives you secure access to your electronic health record. If you see a primary care provider, you can also send messages to your care team and make appointments. If you have questions, please call your primary care clinic.  If you do not have a primary care provider, please call [phone] and they will assist you.        Care EveryWhere ID     This is your Care EveryWhere ID. This could be used by other organizations to access your [place] medical records  [id number]        Your Vitals Were     Pulse Temperature Respirations Height BMI (Body Mass Index)       56 97.4  F  "(36.3  C) (Tympanic) 14 5' 7\" (1.702 m) 32.7 kg/m2        Blood Pressure from Last 3 Encounters:   04/02/18 130/78   08/10/17 123/65   05/30/17 114/76    Weight from Last 3 Encounters:   04/02/18 208 lb 12.8 oz (94.7 kg)   08/10/17 186 lb (84.4 kg)   05/30/17 178 lb (80.7 kg)                 Today's Medication Changes          These changes are accurate as of 4/2/18  2:10 PM.  If you have any questions, ask your nurse or doctor.               Start taking these medicines.        Dose/Directions    clindamycin 150 MG capsule   Commonly known as:  CLEOCIN   Used for:  Cyst of skin   Started by:  Jessica Lemus APRN CNP        Dose:  150 mg   Take 1 capsule (150 mg) by mouth 3 times daily   Quantity:  30 capsule   Refills:  0            Where to get your medicines      These medications were sent to [facility] [address] [place] [address] [address]  [id number], [place] [address]     Phone:  [phone]     clindamycin 150 MG capsule                Primary Care Provider Office Phone # Fax #    Donnell Buchanan [phone] [address] [facility] [address]        Equal Access to Services     RAIN OHARA : Hadii mery ku hadasho Soomaali, waaxda luqadaha, qaybta kaalmada adeegyada, cheko redding. So [facility] [phone].    ATENCIÓN: Si habla español, tiene a doll disposición servicios gratuitos de asistencia lingüística. Romero al [phone].    We comply with applicable federal civil rights laws and [place] laws. We do not discriminate on the basis of race, color, national origin, age, disability, sex, sexual orientation, or gender identity.            Thank you!     Thank you for choosing [facility]  for your care. Our goal is always to provide you with excellent care. Hearing back from our patients is one way we can continue to improve our services. Please take a few minutes to complete the written survey that you may receive " in the mail after your visit with us. Thank you!             Your Updated Medication List - Protect others around you: Learn how to safely use, store and throw away your medicines at www.disposemymeds.org.          This list is accurate as of 4/2/18  2:10 PM.  Always use your most recent med list.                   Brand Name Dispense Instructions for use Diagnosis    atenolol 25 MG tablet    TENORMIN    90 tablet    TAKE ONE-HALF TABLET BY MOUTH ONCE DAILY    Tachycardia, Essential hypertension, benign       clindamycin 150 MG capsule    CLEOCIN    30 capsule    Take 1 capsule (150 mg) by mouth 3 times daily    Cyst of skin       cyclobenzaprine 5 MG tablet    FLEXERIL    90 tablet    May take 1 or 2 tablets 3 times a day as needed for muscle spasm and pain. Sedating. Preferably take at bedtime    Strain of trapezius muscle, right, initial encounter       FISH OIL TRIPLE STRENGTH PO      Take 1 capsule by mouth daily.        MULTIVITAMIN PO      2 tabs daily        NEW MED      St Johns Wort-taking daily.    RUQ abdominal pain       PROBIOTIC DAILY PO      Take 1 capsule by mouth 2 times daily.        Turmeric Curcumin Caps      Take 1 tablet by mouth daily        VITAMIN D3 PO      Take 400 Units by mouth daily

## 2018-04-02 NOTE — NURSING NOTE
"Chief Complaint   Patient presents with     Breast Mass     Found lump under left breast one week ago. Lump is painful and red with a black center. Seems to be getting bigger.        Initial /78 (BP Location: Left arm, Patient Position: Sitting, Cuff Size: Adult Large)  Pulse 56  Temp 97.4  F (36.3  C) (Tympanic)  Resp 14  Ht 5' 7\" (1.702 m)  Wt 208 lb 12.8 oz (94.7 kg)  BMI 32.7 kg/m2 Estimated body mass index is 32.7 kg/(m^2) as calculated from the following:    Height as of this encounter: 5' 7\" (1.702 m).    Weight as of this encounter: 208 lb 12.8 oz (94.7 kg).  Medication Reconciliation: complete     April AR Varghese      "

## 2018-04-13 ENCOUNTER — HOSPITAL ENCOUNTER (OUTPATIENT)
Dept: MAMMOGRAPHY | Facility: CLINIC | Age: 58
Discharge: HOME | End: 2018-04-13
Attending: NURSE PRACTITIONER | Admitting: NURSE PRACTITIONER
Payer: COMMERCIAL

## 2018-04-13 DIAGNOSIS — Z12.31 ENCOUNTER FOR SCREENING MAMMOGRAM FOR BREAST CANCER: ICD-10-CM

## 2018-04-13 PROCEDURE — 77067 SCR MAMMO BI INCL CAD: CPT

## 2018-06-27 DIAGNOSIS — I10 ESSENTIAL HYPERTENSION, BENIGN: ICD-10-CM

## 2018-06-27 DIAGNOSIS — R00.0 TACHYCARDIA: ICD-10-CM

## 2018-06-28 NOTE — TELEPHONE ENCOUNTER
"Requested Prescriptions   Pending Prescriptions Disp Refills     atenolol (TENORMIN) 25 MG tablet [Pharmacy Med Name: ATENOLOL 25MG       TAB]  Last Written Prescription Date:  11/15/17  Last Fill Quantity: 90,  # refills: 0   Last office visit: 4/2/2018 with prescribing provider:  04/02/18   Future Office Visit:     90 tablet 0     Sig: TAKE ONE-HALF TABLET BY MOUTH ONCE DAILY    Beta-Blockers Protocol Passed    6/27/2018  8:59 PM       Passed - Blood pressure under 140/90 in past 12 months    BP Readings from Last 3 Encounters:   04/02/18 130/78   08/10/17 123/65   05/30/17 114/76          Passed - Patient is age 6 or older       Passed - Recent (12 mo) or future (30 days) visit within the authorizing provider's specialty    Patient had office visit in the last 12 months or has a visit in the next 30 days with authorizing provider or within the authorizing provider's specialty.  See \"Patient Info\" tab in inbasket, or \"Choose Columns\" in Meds & Orders section of the refill encounter.              "

## 2018-06-29 RX ORDER — ATENOLOL 25 MG/1
TABLET ORAL
Qty: 90 TABLET | Refills: 0 | Status: SHIPPED | OUTPATIENT
Start: 2018-06-29 | End: 2019-01-04

## 2018-06-29 NOTE — TELEPHONE ENCOUNTER
Prescription approved per [facility] Refill Protocol.    Chanda SNOWDEN RN

## 2018-07-27 ENCOUNTER — OFFICE VISIT (OUTPATIENT)
Dept: FAMILY MEDICINE | Facility: CLINIC | Age: 58
End: 2018-07-27
Payer: COMMERCIAL

## 2018-07-27 VITALS
BODY MASS INDEX: 33.39 KG/M2 | WEIGHT: 213.2 LBS | RESPIRATION RATE: 20 BRPM | TEMPERATURE: 98.2 F | DIASTOLIC BLOOD PRESSURE: 80 MMHG | HEART RATE: 64 BPM | SYSTOLIC BLOOD PRESSURE: 130 MMHG

## 2018-07-27 DIAGNOSIS — F32.2 SEVERE SINGLE CURRENT EPISODE OF MAJOR DEPRESSIVE DISORDER, WITHOUT PSYCHOTIC FEATURES (H): Primary | ICD-10-CM

## 2018-07-27 DIAGNOSIS — F33.8 SEASONAL AFFECTIVE DISORDER (H): ICD-10-CM

## 2018-07-27 PROCEDURE — 99214 OFFICE O/P EST MOD 30 MIN: CPT | Performed by: NURSE PRACTITIONER

## 2018-07-27 RX ORDER — ESCITALOPRAM OXALATE 10 MG/1
10 TABLET ORAL DAILY
Qty: 30 TABLET | Refills: 1 | Status: SHIPPED | OUTPATIENT
Start: 2018-07-27 | End: 2018-09-28

## 2018-07-27 RX ORDER — ESCITALOPRAM OXALATE 10 MG/1
10 TABLET ORAL DAILY
Qty: 30 TABLET | Refills: 1 | Status: SHIPPED | OUTPATIENT
Start: 2018-07-27 | End: 2018-07-27

## 2018-07-27 ASSESSMENT — ENCOUNTER SYMPTOMS
NERVOUS/ANXIOUS: 0
CONSTIPATION: 0
SHORTNESS OF BREATH: 0
DIZZINESS: 0
DECREASED CONCENTRATION: 1
EYE ITCHING: 0
DYSPHORIC MOOD: 1
FEVER: 0
SINUS PRESSURE: 0
COUGH: 0
DIARRHEA: 0
LIGHT-HEADEDNESS: 0
FATIGUE: 0
CHILLS: 0
EYE DISCHARGE: 0
WHEEZING: 0
NAUSEA: 0
RHINORRHEA: 0
HEADACHES: 0
SORE THROAT: 0
SLEEP DISTURBANCE: 0
DIAPHORESIS: 0

## 2018-07-27 ASSESSMENT — ANXIETY QUESTIONNAIRES
1. FEELING NERVOUS, ANXIOUS, OR ON EDGE: MORE THAN HALF THE DAYS
5. BEING SO RESTLESS THAT IT IS HARD TO SIT STILL: SEVERAL DAYS
GAD7 TOTAL SCORE: 12
3. WORRYING TOO MUCH ABOUT DIFFERENT THINGS: MORE THAN HALF THE DAYS
7. FEELING AFRAID AS IF SOMETHING AWFUL MIGHT HAPPEN: SEVERAL DAYS
6. BECOMING EASILY ANNOYED OR IRRITABLE: MORE THAN HALF THE DAYS
2. NOT BEING ABLE TO STOP OR CONTROL WORRYING: MORE THAN HALF THE DAYS

## 2018-07-27 ASSESSMENT — PATIENT HEALTH QUESTIONNAIRE - PHQ9: 5. POOR APPETITE OR OVEREATING: MORE THAN HALF THE DAYS

## 2018-07-27 ASSESSMENT — PAIN SCALES - GENERAL: PAINLEVEL: NO PAIN (0)

## 2018-07-27 NOTE — MR AVS SNAPSHOT
After Visit Summary   7/27/2018    Hannah Moreno    MRN: [id number]           Patient Information     Date Of Birth          1960        Visit Information        Provider Department      7/27/2018 10:40 AM Jessica Lemus APRN [facility]        Today's Diagnoses     Severe single current episode of major depressive disorder, without psychotic features (H)    -  1    Seasonal affective disorder (H)          Care Instructions    Plan follow up in 2 months or sooner if needed           Follow-ups after your visit        Additional Services     MENTAL HEALTH REFERRAL  - Adult; Outpatient Treatment; Individual/Couples/Family/Group Therapy/Health Psychology; G: [facility] [phone]; We will contact you to schedule the appointment or please call with any questions       All scheduling is subject to the client's specific insurance plan & benefits, provider/location availability, and provider clinical specialities.  Please arrive 15 minutes early for your first appointment and bring your completed paperwork.    Please be aware that coverage of these services is subject to the terms and limitations of your health insurance plan.  Call member services at your health plan with any benefit or coverage questions.                            Who to contact     Normal or non-critical lab and imaging results will be communicated to you by MyChart, letter or phone within 4 business days after the clinic has received the results. If you do not hear from us within 7 days, please contact the clinic through MyChart or phone. If you have a critical or abnormal lab result, we will notify you by phone as soon as possible.  Submit refill requests through [employer] or call your pharmacy and they will forward the refill request to us. Please allow 3 business days for your refill to be completed.          If you need to speak with a  for additional  information , please call: [phone]           Additional Information About Your Visit        MyChart Information     [employer] gives you secure access to your electronic health record. If you see a primary care provider, you can also send messages to your care team and make appointments. If you have questions, please call your primary care clinic.  If you do not have a primary care provider, please call [phone] and they will assist you.        Care EveryWhere ID     This is your Care EveryWhere ID. This could be used by other organizations to access your [facility] medical records  [id number]        Your Vitals Were     Pulse Temperature Respirations BMI (Body Mass Index)          64 98.2  F (36.8  C) (Tympanic) 20 33.39 kg/m2         Blood Pressure from Last 3 Encounters:   07/27/18 130/80   04/02/18 130/78   08/10/17 123/65    Weight from Last 3 Encounters:   07/27/18 213 lb 3.2 oz (96.7 kg)   04/02/18 208 lb 12.8 oz (94.7 kg)   08/10/17 186 lb (84.4 kg)              We Performed the Following     MENTAL HEALTH REFERRAL  - Adult; Outpatient Treatment; Individual/Couples/Family/Group Therapy/Health Psychology; [facility]: [facility] [phone]; We will contact you to schedule the appointment or please call with any questions          Today's Medication Changes          These changes are accurate as of 7/27/18 11:11 AM.  If you have any questions, ask your nurse or doctor.               Start taking these medicines.        Dose/Directions    escitalopram 10 MG tablet   Commonly known as:  LEXAPRO   Used for:  Severe single current episode of major depressive disorder, without psychotic features (H)   Started by:  Jessica Lemus APRN CNP        Dose:  10 mg   Take 1 tablet (10 mg) by mouth daily   Quantity:  30 tablet   Refills:  1       order for DME   Used for:  Severe single current episode of major depressive disorder, without psychotic features (H), Seasonal affective disorder (H)    Started by:  Jessica Lemus APRN CNP        Equipment being ordered: Please provide with SAD light   Quantity:  1 Device   Refills:  0         Stop taking these medicines if you haven't already. Please contact your care team if you have questions.     clindamycin 150 MG capsule   Commonly known as:  CLEOCIN   Stopped by:  Jessica Lemus APRN CNP           cyclobenzaprine 5 MG tablet   Commonly known as:  FLEXERIL   Stopped by:  Jessica Lemus APRN CNP           Turmeric Curcumin Caps   Stopped by:  Jessica Lemus APRN CNP           VITAMIN D3 PO   Stopped by:  Jessica Lemus APRN CNP                Where to get your medicines      These medications were sent to [facility] - [place], [place] - [address]  [address] [facility] [address]     Phone:  [phone]     escitalopram 10 MG tablet         Some of these will need a paper prescription and others can be bought over the counter.  Ask your nurse if you have questions.     Bring a paper prescription for each of these medications     order for DME                Primary Care Provider Office Phone # Fax #    Donnell Buchanan [phone] [address] [facility] [address]        Equal Access to Services     RAIN OHARA AH: Hadii mery ku hadasho Soomaali, waaxda luqadaha, qaybta kaalmada adeegyada, waxay toroin haynaima redding. So [facility] [phone].    ATENCIÓN: Si habla español, tiene a doll disposición servicios gratkhoaos de asistencia lingüística. Llkaren al [phone].    We comply with applicable federal civil rights laws and [place] laws. We do not discriminate on the basis of race, color, national origin, age, disability, sex, sexual orientation, or gender identity.            Thank you!     Thank you for choosing [facility]  for your care. Our goal is always to provide you with excellent care. Hearing back from our patients is one way  we can continue to improve our services. Please take a few minutes to complete the written survey that you may receive in the mail after your visit with us. Thank you!             Your Updated Medication List - Protect others around you: Learn how to safely use, store and throw away your medicines at www.disposemymeds.org.          This list is accurate as of 7/27/18 11:11 AM.  Always use your most recent med list.                   Brand Name Dispense Instructions for use Diagnosis    atenolol 25 MG tablet    TENORMIN    90 tablet    TAKE ONE-HALF TABLET BY MOUTH ONCE DAILY    Tachycardia, Essential hypertension, benign       escitalopram 10 MG tablet    LEXAPRO    30 tablet    Take 1 tablet (10 mg) by mouth daily    Severe single current episode of major depressive disorder, without psychotic features (H)       FISH OIL TRIPLE STRENGTH PO      Take 1 capsule by mouth daily.        MULTIVITAMIN PO      2 tabs daily        NEW MED      [place] Johns Wort-taking daily.    RUQ abdominal pain       order for DME     1 Device    Equipment being ordered: Please provide with SAD light    Severe single current episode of major depressive disorder, without psychotic features (H), Seasonal affective disorder (H)       PROBIOTIC DAILY PO      Take 1 capsule by mouth 2 times daily.

## 2018-07-27 NOTE — PROGRESS NOTES
"  SUBJECTIVE:   Hannah Moreno is a 57 year old female who presents to clinic today for the following health issues:      Abnormal Mood Symptoms  Onset: Since winter. She feels depressed every winter and it usually gets better by the time nice weather comes back. This year she isn't feeling better. She is feeling overwhelming stress. Too much to do and not enough time.     Description:   Depression: YES  Anxiety: YES    Accompanying Signs & Symptoms:feels overwhelmed, gaining weight, hard time finding words  Still participating in activities that you used to enjoy: no  Fatigue: YES  Irritability: YES  Difficulty concentrating: YES  Changes in appetite: no   Problems with sleep: YES- wakes during the night and is unable to fall back to sleep  Heart racing/beating fast : no   Thoughts of hurting yourself or others: Thought about suicide over the winter.   Doesn't believe she would ever act on it because her mother committed suicide when she was younger. She knows what it feels like to be a \"surviver\" after suicide. She doesn't have a plan either.     History:   Recent stress: YES- Stress at work. Sold house in June and can't move into new home until August. Is living with ex- currently.   Prior depression hospitalization: None  Family history of depression: YES-mother had depression while going through menopause  Family history of anxiety: no     Precipitating factors:   Alcohol/drug use: no     Alleviating factors:  none    Therapies Tried and outcome: Is going to therapy. Is afraid to take medications because she doesn't want to gain weight. [place]'s wort helps in the winter months.     Problem list and histories reviewed & adjusted, as indicated.  Additional history: as documented    Current Outpatient Prescriptions   Medication Sig Dispense Refill     atenolol (TENORMIN) 25 MG tablet TAKE ONE-HALF TABLET BY MOUTH ONCE DAILY 90 tablet 0     escitalopram (LEXAPRO) 10 MG tablet Take 1 tablet (10 mg) by mouth " daily 30 tablet 1     MULTIVITAMIN OR 2 tabs daily       NEW MED St Johns Wort-taking daily.       order for DME Equipment being ordered: Please provide with SAD light 1 Device 0     Probiotic Product (PROBIOTIC DAILY PO) Take 1 capsule by mouth 2 times daily.       Omega-3 Fatty Acids (FISH OIL TRIPLE STRENGTH PO) Take 1 capsule by mouth daily.       [DISCONTINUED] escitalopram (LEXAPRO) 10 MG tablet Take 1 tablet (10 mg) by mouth daily 30 tablet 1     Allergies   Allergen Reactions     Amoxicillin Hives     Sulfa Drugs Hives       Reviewed and updated as needed this visit by clinical staff  Tobacco  Allergies  Meds  Med Hx  Surg Hx  Fam Hx  Soc Hx      Reviewed and updated as needed this visit by Provider        During the winter will suffer with depression.  Has had this for some time.  Usually, does okay because knows there will be in and.  Does not have a light.  Thinks would benefit from a light.  This year feels like depression never ended. Feels overwhelming stress at work. Moved and has to live with ex  until got new place. Had to travel for work. Closes on new place Aug 6th. Is going to be leaving for [place] later tonight for work. Has a co worker that is a good support system. Feels like is in a brain fog at times. Does not back out plans. Has been going to counseling. Helps to talk to counselor. Got to talked to at work for performance issues. Not documenting like should be/ Mom committed suicide.  No thoughts of harming self or others.  Has been on Zoloft in the past, in 2006.  Does not recall the circumstances regarding that if it worked or if had any side effects.  Feels at work that more tasks are getting assigned to her and is having less time to meet deadlines.  Feels like no matter how good of a job does is never good enough.  When goes home at night will just sit there has no motivation to do anything.  Coworker that is a good support does encourage her and they go for walks over lunch  hour.  No difficulty sleeping, is sleeping up to 10 hours per night.    ROS:  Review of Systems   Constitutional: Negative for chills, diaphoresis, fatigue and fever.   HENT: Negative for ear discharge, ear pain, hearing loss, rhinorrhea, sinus pressure and sore throat.    Eyes: Negative for discharge and itching.   Respiratory: Negative for cough, shortness of breath and wheezing.    Gastrointestinal: Negative for constipation, diarrhea and nausea.   Skin: Negative for rash.   Neurological: Negative for dizziness, light-headedness and headaches.   Psychiatric/Behavioral: Positive for decreased concentration and dysphoric mood. Negative for sleep disturbance and suicidal ideas. The patient is not nervous/anxious.          OBJECTIVE:     /80 (BP Location: Right arm, Patient Position: Sitting, Cuff Size: Adult Regular)  Pulse 64  Temp 98.2  F (36.8  C) (Tympanic)  Resp 20  Wt 213 lb 3.2 oz (96.7 kg)  BMI 33.39 kg/m2  Body mass index is 33.39 kg/(m^2).  Physical Exam  No PE completed, visit was 100% discussion.   Good eye contact. Interacts with conversation. No thoughts of harming self or others.    ASSESSMENT/PLAN:   1. Severe single current episode of major depressive disorder, without psychotic features (H)  Treatment plan and medications reviewed and understood by the patient   Risks, benefits and potential side effects (including sexual dysfunction and suicidal thoughs) of antidepressant/antianxiety medication reviewed with patient  Reviewed the importance of medication compliance  Instructed to call or return with:  Worsening symptoms  Suicidal/homicidial ideation  Hallucinations or Delusions  Medication side effects   Plan to follow up in 2 months  Plans to continue 6 sessions of counseling that are available to her through her employer and then would like to continue outpatient counseling.  - MENTAL HEALTH REFERRAL  - Adult; Outpatient Treatment; Individual/Couples/Family/Group Therapy/Health  Psychology; [facility]: [facility] [phone]; We will contact you to schedule the appointment or please call with any questions  - order for DME; Equipment being ordered: Please provide with SAD light  Dispense: 1 Device; Refill: 0  - escitalopram (LEXAPRO) 10 MG tablet; Take 1 tablet (10 mg) by mouth daily  Dispense: 30 tablet; Refill: 1    2. Seasonal affective disorder (H)  Order written for light.  Prescription for Lexapro.  - order for DME; Equipment being ordered: Please provide with SAD light  Dispense: 1 Device; Refill: 0    During this visit greater than 100% of the 27 minutes for this appointment was spent in counseling and/or coordinating care of above stated issues.     KAIT Rain [facility]

## 2018-07-28 ASSESSMENT — ANXIETY QUESTIONNAIRES: GAD7 TOTAL SCORE: 12

## 2018-07-28 ASSESSMENT — PATIENT HEALTH QUESTIONNAIRE - PHQ9: SUM OF ALL RESPONSES TO PHQ QUESTIONS 1-9: 18

## 2018-08-06 PROBLEM — F32.2 SEVERE SINGLE CURRENT EPISODE OF MAJOR DEPRESSIVE DISORDER, WITHOUT PSYCHOTIC FEATURES (H): Status: ACTIVE | Noted: 2018-08-06

## 2018-08-07 ENCOUNTER — TELEPHONE (OUTPATIENT)
Dept: FAMILY MEDICINE | Facility: CLINIC | Age: 58
End: 2018-08-07

## 2018-08-07 DIAGNOSIS — F32.2 SEVERE SINGLE CURRENT EPISODE OF MAJOR DEPRESSIVE DISORDER, WITHOUT PSYCHOTIC FEATURES (H): Primary | ICD-10-CM

## 2018-08-07 NOTE — TELEPHONE ENCOUNTER
Panel Management Review      Patient has the following on her problem list:     Depression / Dysthymia review    Measure:  Needs PHQ-9 score of 4 or less during index window.  Administer PHQ-9 and if score is 5 or more, send encounter to provider for next steps.    4 - 8 month window range: 11/27/18-3/27/19    PHQ-9 SCORE 2/10/2009 11/20/2013 7/27/2018   Total Score 10 1 -   Total Score - - 18       If PHQ-9 recheck is 5 or more, route to provider for next steps.    Patient is due for:  DAP    Hypertension   Last three blood pressure readings:  BP Readings from Last 3 Encounters:   07/27/18 130/80   04/02/18 130/78   08/10/17 123/65     Blood pressure: Passed    HTN Guidelines:  Age 18-59 BP range:  Less than 140/90  Age 60-85 with Diabetes:  Less than 140/90  Age 60-85 without Diabetes:  less than 150/90      Composite cancer screening  Chart review shows that this patient is due/due soon for the following None  Summary:    Patient is due/failing the following:     Health Maintenance Due   Topic Date Due     DEPRESSION ACTION PLAN Q1 YR  08/28/1978     HIV SCREEN (SYSTEM ASSIGNED)  08/28/1978     ADVANCE DIRECTIVE PLANNING Q5 YRS  08/28/2015         Action needed:   Needs DAP    Type of outreach:    Sent [employer] message.    Questions for provider review:    None                                                                                                                                    Kacey Varghese CMA       Chart routed to none .

## 2018-08-07 NOTE — LETTER
My Depression Action Plan  Name: Hannah Moreno   Date of Birth 1960  Date: 8/7/2018    My doctor: Donnell Buchanan   My clinic: [address] [id number] [phone]          GREEN    ZONE   Good Control    What it looks like:     Things are going generally well. You have normal up s and down s. You may even feel depressed from time to time, but bad moods usually last less than a day.   What you need to do:  1. Continue to care for yourself (see self care plan)  2. Check your depression survival kit and update it as needed  3. Follow your physician s recommendations including any medication.  4. Do not stop taking medication unless you consult with your physician first.           YELLOW         ZONE Getting Worse    What it looks like:     Depression is starting to interfere with your life.     It may be hard to get out of bed; you may be starting to isolate yourself from others.    Symptoms of depression are starting to last most all day and this has happened for several days.     You may have suicidal thoughts but they are not constant.   What you need to do:     1. Call your care team, your response to treatment will improve if you keep your care team informed of your progress. Yellow periods are signs an adjustment may need to be made.     2. Continue your self-care, even if you have to fake it!    3. Talk to someone in your support network    4. Open up your depression survival kit           RED    ZONE Medical Alert - Get Help    What it looks like:     Depression is seriously interfering with your life.     You may experience these or other symptoms: You can t get out of bed most days, can t work or engage in other necessary activities, you have trouble taking care of basic hygiene, or basic responsibilities, thoughts of suicide or death that will not go away, self-injurious behavior.     What you need to do:  1. Call your care team and  request a same-day appointment. If they are not available (weekends or after hours) call your local crisis line, emergency room or 911.            Depression Self Care Plan / Survival Kit    Self-Care for Depression  Here s the deal. Your body and mind are really not as separate as most people think.  What you do and think affects how you feel and how you feel influences what you do and think. This means if you do things that people who feel good do, it will help you feel better.  Sometimes this is all it takes.  There is also a place for medication and therapy depending on how severe your depression is, so be sure to consult with your medical provider and/ or Behavioral Health Consultant if your symptoms are worsening or not improving.     In order to better manage my stress, I will:    Exercise  Get some form of exercise, every day. This will help reduce pain and release endorphins, the  feel good  chemicals in your brain. This is almost as good as taking antidepressants!  This is not the same as joining a gym and then never going! (they count on that by the way ) It can be as simple as just going for a walk or doing some gardening, anything that will get you moving.      Hygiene   Maintain good hygiene (Get out of bed in the morning, Make your bed, Brush your teeth, Take a shower, and Get dressed like you were going to work, even if you are unemployed).  If your clothes don't fit try to get ones that do.    Diet  I will strive to eat foods that are good for me, drink plenty of water, and avoid excessive sugar, caffeine, alcohol, and other mood-altering substances.  Some foods that are helpful in depression are: complex carbohydrates, B vitamins, flaxseed, fish or fish oil, fresh fruits and vegetables.    Psychotherapy  I agree to participate in Individual Therapy (if recommended).    Medication  If prescribed medications, I agree to take them.  Missing doses can result in serious side effects.  I understand that  drinking alcohol, or other illicit drug use, may cause potential side effects.  I will not stop my medication abruptly without first discussing it with my provider.    Staying Connected With Others  I will stay in touch with my friends, family members, and my primary care provider/team.    Use your imagination  Be creative.  We all have a creative side; it doesn t matter if it s oil painting, sand castles, or mud pies! This will also kick up the endorphins.    Witness Beauty  (AKA stop and smell the roses) Take a look outside, even in mid-winter. Notice colors, textures. Watch the squirrels and birds.     Service to others  Be of service to others.  There is always someone else in need.  By helping others we can  get out of ourselves  and remember the really important things.  This also provides opportunities for practicing all the other parts of the program.    Humor  Laugh and be silly!  Adjust your TV habits for less news and crime-drama and more comedy.    Control your stress  Try breathing deep, massage therapy, biofeedback, and meditation. Find time to relax each day.     My support system    Clinic Contact:  Phone number:    Contact 1:  Phone number:    Contact 2:  Phone number:    Jewish/:  Phone number:    Therapist:  Phone number:    Local crisis center:    Phone number:    Other community support:  Phone number:

## 2018-09-20 ENCOUNTER — OFFICE VISIT (OUTPATIENT)
Dept: PSYCHOLOGY | Facility: CLINIC | Age: 58
End: 2018-09-20
Attending: NURSE PRACTITIONER
Payer: COMMERCIAL

## 2018-09-20 ENCOUNTER — TELEPHONE (OUTPATIENT)
Dept: PSYCHOLOGY | Facility: CLINIC | Age: 58
End: 2018-09-20

## 2018-09-20 ENCOUNTER — FCC EXTENDED DOCUMENTATION (OUTPATIENT)
Dept: PSYCHOLOGY | Facility: CLINIC | Age: 58
End: 2018-09-20

## 2018-09-20 DIAGNOSIS — F33.2 SEVERE RECURRENT MAJOR DEPRESSION WITHOUT PSYCHOTIC FEATURES (H): Primary | ICD-10-CM

## 2018-09-20 PROCEDURE — 90834 PSYTX W PT 45 MINUTES: CPT | Performed by: COUNSELOR

## 2018-09-20 ASSESSMENT — PATIENT HEALTH QUESTIONNAIRE - PHQ9: 5. POOR APPETITE OR OVEREATING: MORE THAN HALF THE DAYS

## 2018-09-20 ASSESSMENT — ANXIETY QUESTIONNAIRES
7. FEELING AFRAID AS IF SOMETHING AWFUL MIGHT HAPPEN: MORE THAN HALF THE DAYS
1. FEELING NERVOUS, ANXIOUS, OR ON EDGE: MORE THAN HALF THE DAYS
GAD7 TOTAL SCORE: 14
2. NOT BEING ABLE TO STOP OR CONTROL WORRYING: NEARLY EVERY DAY
3. WORRYING TOO MUCH ABOUT DIFFERENT THINGS: MORE THAN HALF THE DAYS
IF YOU CHECKED OFF ANY PROBLEMS ON THIS QUESTIONNAIRE, HOW DIFFICULT HAVE THESE PROBLEMS MADE IT FOR YOU TO DO YOUR WORK, TAKE CARE OF THINGS AT HOME, OR GET ALONG WITH OTHER PEOPLE: SOMEWHAT DIFFICULT
6. BECOMING EASILY ANNOYED OR IRRITABLE: MORE THAN HALF THE DAYS
5. BEING SO RESTLESS THAT IT IS HARD TO SIT STILL: SEVERAL DAYS

## 2018-09-20 NOTE — TELEPHONE ENCOUNTER
Writer left voicemail confirming completion of BRANDIE for communication between providers, and request for consultation.

## 2018-09-20 NOTE — Clinical Note
Hannah Pérez Dr. attended her initial therapy session with me today. We completed a verbal Safety Plan, and will complete a more detailed one in next session. She does appear to have a lot of apparent competence, but is struggling significantly. Due to history of trauma and current vulnerabilities, I will be tracking SI closely.   Please let me know if anything further may be helpful. I will CC you on the completed Diagnostic Assessment following its completion next session.  Thank you, Saumya

## 2018-09-20 NOTE — PROGRESS NOTES
"                    Progress Note - Initial Session    Client Name:  Hannah Moreno Date: 9/20/18         Service Type: Individual      Session Start Time: 10:30 am  Session End Time: 11:15 am      Session Length: 38 - 52      Session #: 1     Attendees: Client attended alone         Diagnostic Assessment in progress.  Unable to complete documentation at the conclusion of the first session due to time dedicated to explaining limits of confidentiality and rapport building. Client discussed triggers to mental health symptoms as winter weather, and stressors related to finances, relationships and work. Client discussed history of SI thoughts related to depression symptoms. Client denies any history of planning or intent, as she reports being present when her mother shot herself (client age 26). Client reports \"I know what that does to people, I would never do that to my son or grandson. I know it's a permanent solution to a temporary problem.\" Client committed to safety in session and identified people to utilize for support(coworker, 2 close friends, ex-), and was given crisis line which she is open to utilizing. Plan to utilize next session for more detailed Safety Planning.    Mental Status Assessment:  Appearance:   Appropriate   Eye Contact:   Good   Psychomotor Behavior: Normal   Attitude:   Cooperative   Orientation:   All  Speech   Rate / Production: Normal    Volume:  Normal   Mood:    Normal  Affect:    Appropriate   Thought Content:  Clear   Thought Form:  Coherent  Logical   Insight:    Good       Safety Issues and Plan for Safety and Risk Management:  Client denies current fears or concerns for personal safety.  Client reports the following current or recent suicidal ideation or behaviors: Client endorses history of passive suicidal ideation, denies planning, intent, or history of action. Client reports most recent experience was around 3 weeks ago, and reports it occurs every couple weeks (up to " weekly in the winter)..  Client denies current or recent homicidal ideation or behaviors.  Client denies current or recent self injurious behavior or ideation.  Client denies other safety concerns.  A safety and risk management plan has not been developed at this time, however client was given the after-hours number / 911 should there be a change in any of these risk factors.  Client reports there are no firearms in the house.      Diagnostic Criteria:  A) Recurrent episode(s) - symptoms have been present during the same 2-week period and represent a change from previous functioning 5 or more symptoms (required for diagnosis)   - Depressed mood. Note: In children and adolescents, can be irritable mood.     - Diminished interest or pleasure in all, or almost all, activities.    - Significant weight gainincrease in appetite.    - Decreased sleep.    - Psychomotor activity agitation.    - Fatigue or loss of energy.    - Feelings of worthlessness or inappropriate and excessive guilt.    - Diminished ability to think or concentrate, or indecisiveness.    - Recurrent thoughts of death (not just fear of dying), recurrent suicidal ideation without a specific plan, or a suicide attempt or a specific plan for committing suicide.   B) The symptoms cause clinically significant distress or impairment in social, occupational, or other important areas of functioning  C) The episode is not attributable to the physiological effects of a substance or to another medical condition  D) The occurence of major depressive episode is not better explained by other thought / psychotic disorders  E) There has never been a manic episode or hypomanic episode        DSM5 Diagnoses: (Sustained by DSM5 Criteria Listed Above)  Diagnoses: 296.33 (F33.2) Major Depressive Disorder, Recurrent Episode, Severe With anxious distress  Psychosocial & Contextual Factors: Work stress, recent move that she feels resentful for due to needing to sell home in  order to pay off son's student loans, relationship stressors, history of trauma.  WHODAS 2.0 (12 item)            This questionnaire asks about difficulties due to health conditions. Health conditions  include  disease or illnesses, other health problems that may be short or long lasting,  injuries, mental health or emotional problems, and problems with alcohol or drugs.                     Think back over the past 30 days and answer these questions, thinking about how much  difficulty you had doing the following activities. For each question, please [place] only  one response.    S1 Standing for long periods such as 30 minutes? None =         1   S2 Taking care of household responsibilities? None =         1   S3 Learning a new task, for example, learning how to get to a new place? None =         1   S4 How much of a problem do you have joining community activities (for example, festivals, Judaism or other activities) in the same way as anyone else can? Mild =           2   S5 How much have you been emotionally affected by your health problems? Moderate =   3     In the past 30 days, how much difficulty did you have in:   S6 Concentrating on doing something for ten minutes? Mild =           2   S7 Walking a long distance such as a kilometer (or equivalent)? None =         1   S8 Washing your whole body? None =         1   S9 Getting dressed? None =         1   S10 Dealing with people you do not know? None =         1   S11 Maintaining a friendship? None =         1   S12 Your day to day work? Moderate =   3     H1 Overall, in the past 30 days, how many days were these difficulties present? Record number of days 20   H2 In the past 30 days, for how many days were you totally unable to carry out your usual activities or work because of any health condition? Record number of days  1   H3 In the past 30 days, not counting the days that you were totally unable, for how many days did you cut back or reduce your usual  activities or work because of any health condition? Record number of days 0       Collateral Reports Completed:  Routed note to PCP      PLAN: (Homework, other):  Follow-up appointment scheduled to complete Diagnostic Interview.       Saumya Javier MA, [facility]

## 2018-09-20 NOTE — MR AVS SNAPSHOT
MRN:[id number]                      After Visit Summary   9/20/2018    Hannah Moreno    MRN: [id number]           Visit Information        Provider Department      9/20/2018 10:30 AM Saumya Javier [facility]      Your next 10 appointments already scheduled     Sep 28, 2018  8:00 AM CDT   SHORT with KAIT Castano [facility] ([facility])    [address] [place] [id number] [phone]            Sep 28, 2018  9:30 AM CDT   [facility]    [address] [place] [id number] [phone]            Oct 05, 2018  8:30 AM CDT   Return Visit with BOAZ Moore   [facility] ([facility])    [address] [place] [id number] [phone]            Oct 12, 2018  7:30 AM CDT   Return Visit with BOAZ Moore   [facility] ([facility])    [address] [place] [address] [phone]              MyChart Information     [employer] gives you secure access to your electronic health record. If you see a primary care provider, you can also send messages to your care team and make appointments. If you have questions, please call your primary care clinic.  If you do not have a primary care provider, please call [phone] and they will assist you.        Care EveryWhere ID     This is your Care EveryWhere ID. This could be used by other organizations to access your [place] medical records  [id number]        Equal Access to Services     RAIN OHARA AH: Hadii aad ku hadasho Soomaali, waaxda luqadaha, qaybta kaalmada adeegyada, cheko redding. So [facility] [phone].    ATENCIÓN: Si habla español, tiene a doll disposición servicios gratuitos de asistencia lingüística. Llame al  [phone].    We comply with applicable federal civil rights laws and [place] laws. We do not discriminate on the basis of race, color, national origin, age, disability, sex, sexual orientation, or gender identity.

## 2018-09-21 ASSESSMENT — ANXIETY QUESTIONNAIRES: GAD7 TOTAL SCORE: 14

## 2018-09-21 ASSESSMENT — PATIENT HEALTH QUESTIONNAIRE - PHQ9: SUM OF ALL RESPONSES TO PHQ QUESTIONS 1-9: 21

## 2018-09-28 ENCOUNTER — OFFICE VISIT (OUTPATIENT)
Dept: FAMILY MEDICINE | Facility: CLINIC | Age: 58
End: 2018-09-28
Payer: COMMERCIAL

## 2018-09-28 ENCOUNTER — OFFICE VISIT (OUTPATIENT)
Dept: PSYCHOLOGY | Facility: CLINIC | Age: 58
End: 2018-09-28
Payer: COMMERCIAL

## 2018-09-28 VITALS
DIASTOLIC BLOOD PRESSURE: 78 MMHG | BODY MASS INDEX: 34.52 KG/M2 | HEART RATE: 68 BPM | WEIGHT: 220.4 LBS | RESPIRATION RATE: 20 BRPM | TEMPERATURE: 97.8 F | SYSTOLIC BLOOD PRESSURE: 130 MMHG

## 2018-09-28 DIAGNOSIS — I10 ESSENTIAL HYPERTENSION, BENIGN: Primary | ICD-10-CM

## 2018-09-28 DIAGNOSIS — F33.2 SEVERE RECURRENT MAJOR DEPRESSION WITHOUT PSYCHOTIC FEATURES (H): Primary | ICD-10-CM

## 2018-09-28 DIAGNOSIS — F32.2 SEVERE SINGLE CURRENT EPISODE OF MAJOR DEPRESSIVE DISORDER, WITHOUT PSYCHOTIC FEATURES (H): ICD-10-CM

## 2018-09-28 DIAGNOSIS — Z23 NEED FOR PROPHYLACTIC VACCINATION AND INOCULATION AGAINST INFLUENZA: ICD-10-CM

## 2018-09-28 DIAGNOSIS — F33.8 SEASONAL AFFECTIVE DISORDER (H): ICD-10-CM

## 2018-09-28 PROCEDURE — 90471 IMMUNIZATION ADMIN: CPT | Performed by: NURSE PRACTITIONER

## 2018-09-28 PROCEDURE — 90686 IIV4 VACC NO PRSV 0.5 ML IM: CPT | Performed by: NURSE PRACTITIONER

## 2018-09-28 PROCEDURE — 99214 OFFICE O/P EST MOD 30 MIN: CPT | Mod: 25 | Performed by: NURSE PRACTITIONER

## 2018-09-28 PROCEDURE — 90839 PSYTX CRISIS INITIAL 60 MIN: CPT | Performed by: COUNSELOR

## 2018-09-28 RX ORDER — ESCITALOPRAM OXALATE 20 MG/1
20 TABLET ORAL DAILY
Qty: 90 TABLET | Refills: 0 | Status: SHIPPED | OUTPATIENT
Start: 2018-09-28 | End: 2018-12-28

## 2018-09-28 ASSESSMENT — ENCOUNTER SYMPTOMS
SHORTNESS OF BREATH: 0
LIGHT-HEADEDNESS: 0
SLEEP DISTURBANCE: 0
HEADACHES: 0
DIARRHEA: 0
WHEEZING: 0
COUGH: 0
CHEST TIGHTNESS: 0
DYSPHORIC MOOD: 1
NUMBNESS: 0
FATIGUE: 0
NAUSEA: 0
PALPITATIONS: 1
ABDOMINAL PAIN: 0
MYALGIAS: 0
DIZZINESS: 0
SORE THROAT: 0
CONSTIPATION: 0
VOMITING: 0
ARTHRALGIAS: 0
ABDOMINAL DISTENTION: 0
NERVOUS/ANXIOUS: 1
RHINORRHEA: 0

## 2018-09-28 ASSESSMENT — PATIENT HEALTH QUESTIONNAIRE - PHQ9: 5. POOR APPETITE OR OVEREATING: MORE THAN HALF THE DAYS

## 2018-09-28 ASSESSMENT — PAIN SCALES - GENERAL: PAINLEVEL: NO PAIN (0)

## 2018-09-28 ASSESSMENT — ANXIETY QUESTIONNAIRES
1. FEELING NERVOUS, ANXIOUS, OR ON EDGE: MORE THAN HALF THE DAYS
2. NOT BEING ABLE TO STOP OR CONTROL WORRYING: MORE THAN HALF THE DAYS
GAD7 TOTAL SCORE: 11
7. FEELING AFRAID AS IF SOMETHING AWFUL MIGHT HAPPEN: NOT AT ALL
6. BECOMING EASILY ANNOYED OR IRRITABLE: MORE THAN HALF THE DAYS
3. WORRYING TOO MUCH ABOUT DIFFERENT THINGS: MORE THAN HALF THE DAYS
5. BEING SO RESTLESS THAT IT IS HARD TO SIT STILL: SEVERAL DAYS

## 2018-09-28 NOTE — PROGRESS NOTES
Progress Note - Initial Session    Client Name:  Hannah Moreno Date: 9/28/2018         Service Type: Individual      Session Start Time: 9:30 am  Session End Time: 10:20 am      Session Length: 38 - 52      Session #: 2     Attendees: Client attended alone         Diagnostic Assessment in progress.  Unable to complete documentation at the conclusion of the first session due to time dedicated to Safety Planning. Client endorsees high symptoms since previous session, and endorses SI thoughts.      Mental Status Assessment:  Appearance:   Appropriate   Eye Contact:   Good   Psychomotor Behavior: Normal   Attitude:   Cooperative   Orientation:   All  Speech   Rate / Production: Normal    Volume:  Normal   Mood:    Normal  Affect:    Appropriate   Thought Content:  Clear   Thought Form:  Coherent  Logical   Insight:    Good       Safety Issues and Plan for Safety and Risk Management:  Client reports the following current fears or concerns for personal safety: Client endorses high distress and moderate levels of SI since previous session triggered by conflict with daughter in law. Client committed to safety in session and developed safety plan. .  Client reports the following current or recent suicidal ideation or behaviors: Client endorses high distress and moderate levels of SI since previous session triggered by conflict with daughter in law. Client committed to safety in session and developed safety plan. .  Client denies current or recent homicidal ideation or behaviors.  Client denies current or recent self injurious behavior or ideation.  Client denies other safety concerns.  A safety and risk management plan has been developed including: Client consented to co-developed safety plan.  [facility]'s safety and risk management plan was completed.  Client agreed to use safety plan should any safety concerns arise.  A copy was given to the patient.  Client reports there are no firearms in the  house.      Diagnostic Criteria:  A) Recurrent episode(s) - symptoms have been present during the same 2-week period and represent a change from previous functioning 5 or more symptoms (required for diagnosis)   - Depressed mood. Note: In children and adolescents, can be irritable mood.     - Diminished interest or pleasure in all, or almost all, activities.    - Significant weight gainincrease in appetite.    - Decreased sleep.    - Psychomotor activity agitation.    - Fatigue or loss of energy.    - Feelings of worthlessness or inappropriate and excessive guilt.    - Diminished ability to think or concentrate, or indecisiveness.    - Recurrent thoughts of death (not just fear of dying), recurrent suicidal ideation without a specific plan, or a suicide attempt or a specific plan for committing suicide.   B) The symptoms cause clinically significant distress or impairment in social, occupational, or other important areas of functioning  C) The episode is not attributable to the physiological effects of a substance or to another medical condition  D) The occurence of major depressive episode is not better explained by other thought / psychotic disorders  E) There has never been a manic episode or hypomanic episode           DSM5 Diagnoses: (Sustained by DSM5 Criteria Listed Above)  Diagnoses:            296.33 (F33.2) Major Depressive Disorder, Recurrent Episode, Severe With anxious distress  Psychosocial & Contextual Factors: Work stress, recent move that she feels resentful for due to needing to sell home in order to pay off son's student loans, relationship stressors, history of trauma.  WHODAS 2.0 (12 item)            See previous case note for completed WHODAS information.      Collateral Reports Completed:  Not Applicable      PLAN: (Homework, other):  Follow-up appointment scheduled to complete Diagnostic Interview.       Saumya Javier MA, [facility]

## 2018-09-28 NOTE — MR AVS SNAPSHOT
"                  MRN:[id number]                      After Visit Summary   9/28/2018    Hannah Moreno    MRN: [id number]           Visit Information        Provider Department      9/28/2018 9:30 AM Saumya Javier [facility]        Your next 10 appointments already scheduled     Oct 04, 2018  8:00 AM CDT   LAB with  LAB   [facility]    [address] [address]   [phone]           Please do not eat 10-12 hours before your appointment if you are coming in fasting for labs on lipids, cholesterol, or glucose (sugar). This does not apply to pregnant women. Water, hot tea and black coffee (with nothing added) are okay. Do not drink other fluids, diet soda or chew gum.            Oct 05, 2018  8:30 AM CDT   Return Visit with Saumya Javier [facility] ([facility])    [address] [place] [phone]   [phone]            Oct 12, 2018  7:30 AM CDT   Return Visit with Saumya Javier [facility] ([facility])    [address] [place] [phone]   [phone]            Dec 28, 2018  8:00 AM CST   SHORT with KAIT Castano [facility] ([address] [address]   [phone]              Care Instructions                                                 Hannah Moreno     SAFETY PLAN:  Step 1: Warning signs / cues (Thoughts, images, mood, situation, behavior) that a crisis may be developing:    Thoughts: \"I don't matter\", \"I can't do this anymore\" and \"I just want this to end\" \"i'm not worthy\" \"I don't measure up\" \"I'm done\"    Images: flashbacks - mother    Thinking Processes: racing thoughts and highly critical and negative thoughts: put downs towards myself - \"Why??\", difficulty making sense of what is going on    Mood: " "helplessness, intense anger and mood swings    Behaviors: isolating/withdrawing , using alcohol, giving things away, not taking care of myself and crying, not eating    Situations: relationship problems: relationship with daughter-in-law, work relationship with boss (emails, unexpected meetings), lack of girlfriends, feeling thrown under the bus by a coworker.  Step 2: Coping strategies - Things I can do to take my mind off of my problems without contacting another person (relaxation technique, physical activity):    Distress Tolerance Strategies:  play with my pet , listen to positive and upbeat music: \"You Say\", sensory based activities/self-soothe with five senses: oils, pray, change body temperature (ice pack/cold water) , paced breathing/progressive muscle relaxation and intense exercise: sprint in place for 2-3 minutes , comedy/funny videos    Physical Activities: go for a walk, yoga, deep breathing and stretching     Focus on helpful thoughts:  \"This is temporary\", \"I will get through this\", \"It always passes\" and remind myself of what is important to me: Micki  Step 3: People and social settings that provide distraction:   Name: Rossana Phone:    Name: Stuart  Phone:     coffee shop, [employer]   Step 4: Remind myself of people and things that are important to me and worth living for:  Prakash Sweet  Step 5: When I am in crisis, I can ask these people to help me use my safety plan:   Name: Stuart Phone:    Name: Vidhi Phone:    Name: Chanda    Phone:   Step 6: Making the environment safe:     remove alcohol, alternate routes: avoid high traffic areas and keep easy access and healthy food options available  Step 7: Professionals or agencies I can contact during a crisis:    [facility] Daytime and After Hours Crisis Number: 646-526-1450    Suicide Prevention Lifeline: 8-273-429-PZTS (8239)    Crisis Text Line Service (available 24 hours a day, 7 days a week): Text [place] to 235210  Call 911 or go " to my nearest emergency department.   I helped develop this safety plan and agree to use it when needed.  I have been given a copy of this plan.      Client signature _________________________________________________________________  Today s date:  9/28/2018  Adapted from Safety Plan Template 2008 Marsha Baker and Oscar Perez is reprinted with the express permission of the authors.  No portion of the Safety Plan Template may be reproduced without the express, written permission.  You can contact the authors at bhs@[email or web address] or [email or web address].               MyChart Information     [employer] gives you secure access to your electronic health record. If you see a primary care provider, you can also send messages to your care team and make appointments. If you have questions, please call your primary care clinic.  If you do not have a primary care provider, please call [phone] and they will assist you.        Care EveryWhere ID     This is your Care EveryWhere ID. This could be used by other organizations to access your [place] medical records  [id number]        Equal Access to Services     RAIN OHARA : Fariha Sewell, cecil jones, qakathy lujan, cheko redding. So [facility] [phone].    ATENCIÓN: Si habla español, tiene a doll disposición servicios gratuitos de asistencia lingüística. Llame al [phone].    We comply with applicable federal civil rights laws and [place] laws. We do not discriminate on the basis of race, color, national origin, age, disability, sex, sexual orientation, or gender identity.

## 2018-09-28 NOTE — PROGRESS NOTES
SUBJECTIVE:   Hannah Moreno is a 58 year old female who presents to clinic today for the following health issues:      Depression and Anxiety Follow-Up    Status since last visit: Mood has improved slightly, brain fog is gone, started therapy last week and she liked it    Other associated symptoms: headache at first, heart palpitations last week, craving salt    Complicating factors:     Significant life event: Yes-  Recent move, new grandson but she doesn't see him much since she doesn't have a good relationship with her daughter-in-law     Current substance abuse: None    PHQ-9 7/27/2018 9/20/2018   Total Score 18 21   Q9: Suicide Ideation Not at all More than half the days     KATHRYN-7 SCORE 7/27/2018 9/20/2018   Total Score 12 14     PHQ-9  English  PHQ-9   Any Language  KATHRYN-7  Suicide Assessment Five-step Evaluation and Treatment (SAFE-T)    Amount of exercise or physical activity: Aerobics once per week, tries to walk during her lunch break at work    Problems taking medications regularly: No    Medication side effects: none    Diet: regular (no restrictions)      Problem list and histories reviewed & adjusted, as indicated.  Additional history: as documented    Current Outpatient Prescriptions   Medication Sig Dispense Refill     atenolol (TENORMIN) 25 MG tablet TAKE ONE-HALF TABLET BY MOUTH ONCE DAILY 90 tablet 0     escitalopram (LEXAPRO) 20 MG tablet Take 1 tablet (20 mg) by mouth daily 90 tablet 0     MULTIVITAMIN OR 2 tabs daily       order for DME Equipment being ordered: Please provide with SAD light 1 Device 0     Probiotic Product (PROBIOTIC DAILY PO) Take 1 capsule by mouth 2 times daily.       [DISCONTINUED] escitalopram (LEXAPRO) 10 MG tablet Take 1 tablet (10 mg) by mouth daily 30 tablet 1     Allergies   Allergen Reactions     Amoxicillin Hives     Sulfa Drugs Hives       Reviewed and updated as needed this visit by clinical staff  Tobacco  Allergies  Meds  Med Hx  Surg Hx  Fam Hx  Soc Hx       Reviewed and updated as needed this visit by Provider        Here today for follow-up after starting Lexapro.  When first starting it was having brain fog, headaches and had been craving salt.  Now, feels like brain fog is better. Still feels like mood is not as good as could be. Started an afgan and just did 2 rows and then walked away. Is going to therapy and is working well.  Working on getting a plan together when it is at the lowest of lows and thinking is not rational.  Reports that Sunday was a bad day, was really down and depressed and was not thinking rationally.  At that time had thought about ending life but realized that that was not the answer.  Reached out to a friend who was very supportive.  Has numbers for crisis hotline at home.  This past weekend had a baby shower for her son, daughter-in-law and new grandchild.  Reports, was not able to hold the baby.  Reports put on the entire shower by herself and no one helped her with packing up her car or cleaning up no one was thankful.  Work has been very stressful with upgrades and new software.  Can feel anxiety raising when is having to go to work.  Will get tightness in chest and fluttering/palpitations at times.  Does not get short of breath, no pain to chest, no dizziness or lightheaded.  Would like to be able to take a couple weeks off of work to just decompress.  Nose has enough PTO time in order to do so.  Question whether needs to be FMLA time instead.  Mother committed suicide, knows that is not the way out but has had thoughts of that in the past.  Therapy has really helped.  Previous prescription that was given for a sad light got lost when moved.  Would like to get it reprinted today.  Current townhouse that recently moved into is an interior so does not have much light.  Plans to get new shades to let more light in.  Would like to start walking and being more active again.    ROS:  Review of Systems   Constitutional: Negative for  fatigue.   HENT: Negative for ear pain, rhinorrhea and sore throat.    Eyes: Negative for visual disturbance.   Respiratory: Negative for cough, chest tightness, shortness of breath and wheezing.    Cardiovascular: Positive for palpitations (with anxiety). Negative for chest pain and leg swelling.   Gastrointestinal: Negative for abdominal distention, abdominal pain, constipation, diarrhea, nausea and vomiting.   Endocrine: Negative for cold intolerance and heat intolerance.   Musculoskeletal: Negative for arthralgias and myalgias.   Skin: Negative for rash.   Neurological: Negative for dizziness, light-headedness, numbness and headaches.   Psychiatric/Behavioral: Positive for dysphoric mood and suicidal ideas (on sunday, none since ). Negative for sleep disturbance. The patient is nervous/anxious.          OBJECTIVE:     /78 (BP Location: Left arm, Patient Position: Sitting, Cuff Size: Adult Regular)  Pulse 68  Temp 97.8  F (36.6  C) (Tympanic)  Resp 20  Wt 220 lb 6.4 oz (100 kg)  BMI 34.52 kg/m2  Body mass index is 34.52 kg/(m^2).  Physical Exam  No PE completed, visit was 100% discussion       ASSESSMENT/PLAN:   1. Essential hypertension, benign  Plan to return for fasting labs.  Atenolol typically prescribed by cardiologist that sees once per year.  - Comprehensive metabolic panel; Future  - Lipid panel reflex to direct LDL Fasting; Future  - Albumin Random Urine Quantitative with Creat Ratio; Future    2. Severe single current episode of major depressive disorder, without psychotic features (H)  We will plan to increase Lexapro to 20 mg orally daily.  Congratulated on following through with counseling and therapy.  Reinforced importance of reaching out to others when is down.  Congratulated on moving to her own home, progress being made and reaching out to friend when was down.  We will plan to follow-up in 3 months or sooner if needed.  - order for DME; Equipment being ordered: Please provide with  SAD light  Dispense: 1 Device; Refill: 0  - escitalopram (LEXAPRO) 20 MG tablet; Take 1 tablet (20 mg) by mouth daily  Dispense: 90 tablet; Refill: 0    3. Seasonal affective disorder (H)  We will plan to increase Lexapro to 20 mg orally daily.  Congratulated on following through with counseling and therapy.  Reinforced importance of reaching out to others when is down.  Congratulated on moving to her own home, progress being made and reaching out to friend when was down.  We will plan to follow-up in 3 months or sooner if needed.  - order for DME; Equipment being ordered: Please provide with SAD light  Dispense: 1 Device; Refill: 0    During this visit greater than 100 % of the 25 minutes for this appointment was spent in counseling and/or coordinating care of above stated issues.     KAIT Rain [facility]

## 2018-09-28 NOTE — PATIENT INSTRUCTIONS
"Hannah S Josh     SAFETY PLAN:  Step 1: Warning signs / cues (Thoughts, images, mood, situation, behavior) that a crisis may be developing:    Thoughts: \"I don't matter\", \"I can't do this anymore\" and \"I just want this to end\" \"i'm not worthy\" \"I don't measure up\" \"I'm done\"    Images: flashbacks - mother    Thinking Processes: racing thoughts and highly critical and negative thoughts: put downs towards myself - \"Why??\", difficulty making sense of what is going on    Mood: helplessness, intense anger and mood swings    Behaviors: isolating/withdrawing , using alcohol, giving things away, not taking care of myself and crying, not eating    Situations: relationship problems: relationship with daughter-in-law, work relationship with boss (emails, unexpected meetings), lack of girlfriends, feeling thrown under the bus by a coworker.  Step 2: Coping strategies - Things I can do to take my mind off of my problems without contacting another person (relaxation technique, physical activity):    Distress Tolerance Strategies:  play with my pet , listen to positive and upbeat music: \"You Say\", sensory based activities/self-soothe with five senses: oils, pray, change body temperature (ice pack/cold water) , paced breathing/progressive muscle relaxation and intense exercise: sprint in place for 2-3 minutes , comedy/funny videos    Physical Activities: go for a walk, yoga, deep breathing and stretching     Focus on helpful thoughts:  \"This is temporary\", \"I will get through this\", \"It always passes\" and remind myself of what is important to me: Micki  Step 3: People and social settings that provide distraction:   Name: Rossana Phone:    Name: Stuart  Phone:     coffee shop, [employer] and store   Step 4: Remind myself of people and things that are important to me and worth living for:  Prakash Sweet  Step 5: When I am in crisis, I can ask these people to help me use my safety plan:   Name: " Stuart Phone:    Name: Vidhi Phone:    Name: Chanda    Phone:   Step 6: Making the environment safe:     remove alcohol, alternate routes: avoid high traffic areas and keep easy access and healthy food options available  Step 7: Professionals or agencies I can contact during a crisis:    [facility] Daytime and After Hours Crisis Number: [phone]    Suicide Prevention Lifeline: 7-813-128-BYBE (4765)    Crisis Text Line Service (available 24 hours a day, 7 days a week): Text [place] to 159722  Call 911 or go to my nearest emergency department.   I helped develop this safety plan and agree to use it when needed.  I have been given a copy of this plan.      Client signature _________________________________________________________________  Today s date:  9/28/2018  Adapted from Safety Plan Template 2008 Marsha Baker and Oscar Perez is reprinted with the express permission of the authors.  No portion of the Safety Plan Template may be reproduced without the express, written permission.  You can contact the authors at [email or web address] or [email or web address].

## 2018-09-28 NOTE — MR AVS SNAPSHOT
After Visit Summary   9/28/2018    Hannah Moreno    MRN: [id number]           Patient Information     Date Of Birth          1960        Visit Information        Provider Department      9/28/2018 8:00 AM Jessica Lemus APRN [facility]        Today's Diagnoses     Essential hypertension, benign    -  1    Severe single current episode of major depressive disorder, without psychotic features (H)        Seasonal affective disorder (H)          Care Instructions    Make a return lab appointment for fasting labs when you have not had anything to eat for 8 hours prior and the only thing to drink is water.     Return papers for FMLA and will work on getting you 2 weeks off of work             Follow-ups after your visit        Follow-up notes from your care team     Return in about 3 months (around 12/28/2018) for A Mood Check.      Your next 10 appointments already scheduled     Sep 28, 2018  9:30 AM CDT   Return Visit with BOAZ Moore   [facility]    [address] [facility] [address] [facility] [address]   [phone]            Oct 04, 2018  8:00 AM CDT   LAB with  LAB   [facility]    [address] [place] [id number] [phone]           Please do not eat 10-12 hours before your appointment if you are coming in fasting for labs on lipids, cholesterol, or glucose (sugar). This does not apply to pregnant women. Water, hot tea and black coffee (with nothing added) are okay. Do not drink other fluids, diet soda or chew gum.            Oct 05, 2018  8:30 AM CDT   Return Visit with BOAZ Moore   [facility] [id number] [facility] [address]   [phone]            Oct 12, 2018  7:30 AM CDT   Return Visit with BOAZ Moore   [address]  [facility] [address]   [phone]            Dec 28, 2018  8:00 AM CST   SHORT with KAIT Castano CNP   [facility]    [address] [address]   [phone]              Future tests that were ordered for you today     Open Future Orders        Priority Expected Expires Ordered    Comprehensive metabolic panel Routine  9/28/2019 9/28/2018    Lipid panel reflex to direct LDL Fasting Routine  9/28/2019 9/28/2018    Albumin Random Urine Quantitative with Creat Ratio Routine  9/28/2019 9/28/2018            Who to contact     Normal or non-critical lab and imaging results will be communicated to you by [email or web address], letter or phone within 4 business days after the clinic has received the results. If you do not hear from us within 7 days, please contact the clinic through Plibberhart or phone. If you have a critical or abnormal lab result, we will notify you by phone as soon as possible.  Submit refill requests through CoupOption or call your pharmacy and they will forward the refill request to us. Please allow 3 business days for your refill to be completed.          If you need to speak with a  for additional information , please call: [phone]           Additional Information About Your Visit        CoupOption Information     CoupOption gives you secure access to your electronic health record. If you see a primary care provider, you can also send messages to your care team and make appointments. If you have questions, please call your primary care clinic.  If you do not have a primary care provider, please call [phone] and they will assist you.        Care EveryWhere ID     This is your Care EveryWhere ID. This could be used by other organizations to access your [place] medical records  [id number]        Your Vitals Were     Pulse Temperature Respirations BMI (Body Mass Index)          68 97.8  F (36.6  C)  (Tympanic) 20 34.52 kg/m2         Blood Pressure from Last 3 Encounters:   09/28/18 130/78   07/27/18 130/80   04/02/18 130/78    Weight from Last 3 Encounters:   09/28/18 220 lb 6.4 oz (100 kg)   07/27/18 213 lb 3.2 oz (96.7 kg)   04/02/18 208 lb 12.8 oz (94.7 kg)                 Today's Medication Changes          These changes are accurate as of 9/28/18  8:46 AM.  If you have any questions, ask your nurse or doctor.               These medicines have changed or have updated prescriptions.        Dose/Directions    escitalopram 20 MG tablet   Commonly known as:  LEXAPRO   This may have changed:    - medication strength  - how much to take   Used for:  Severe single current episode of major depressive disorder, without psychotic features (H)   Changed by:  Jessica Lemus APRN CNP        Dose:  20 mg   Take 1 tablet (20 mg) by mouth daily   Quantity:  90 tablet   Refills:  0         Stop taking these medicines if you haven't already. Please contact your care team if you have questions.     NEW MED   Stopped by:  Jessica Lemus APRN CNP                Where to get your medicines      These medications were sent to [facility] [address] [place] [address] - [address]   [address] [facility] [address]     Phone:  [phone]     escitalopram 20 MG tablet         Some of these will need a paper prescription and others can be bought over the counter.  Ask your nurse if you have questions.     Bring a paper prescription for each of these medications     order for DME                Primary Care Provider Office Phone # Fax #    oDnnell Buchanan [phone] [address] [facility] [address]        Equal Access to Services     RAIN OHARA AH: Fariha Sewell, cecil jones, zach laughlinalmada tai, cheko redding. So [facility] [phone].    ATENCIÓN: Si habla español, tiene a doll disposición servicios gratuitos de asistencia  lingüísticaMarques Jasso al [phone].    We comply with applicable federal civil rights laws and [place] laws. We do not discriminate on the basis of race, color, national origin, age, disability, sex, sexual orientation, or gender identity.            Thank you!     Thank you for choosing [facility]  for your care. Our goal is always to provide you with excellent care. Hearing back from our patients is one way we can continue to improve our services. Please take a few minutes to complete the written survey that you may receive in the mail after your visit with us. Thank you!             Your Updated Medication List - Protect others around you: Learn how to safely use, store and throw away your medicines at www.disposemymeds.org.          This list is accurate as of 9/28/18  8:46 AM.  Always use your most recent med list.                   Brand Name Dispense Instructions for use Diagnosis    atenolol 25 MG tablet    TENORMIN    90 tablet    TAKE ONE-HALF TABLET BY MOUTH ONCE DAILY    Tachycardia, Essential hypertension, benign       escitalopram 20 MG tablet    LEXAPRO    90 tablet    Take 1 tablet (20 mg) by mouth daily    Severe single current episode of major depressive disorder, without psychotic features (H)       MULTIVITAMIN PO      2 tabs daily        order for DME     1 Device    Equipment being ordered: Please provide with SAD light    Severe single current episode of major depressive disorder, without psychotic features (H), Seasonal affective disorder (H)       PROBIOTIC DAILY PO      Take 1 capsule by mouth 2 times daily.

## 2018-09-28 NOTE — PROGRESS NOTES

## 2018-09-28 NOTE — PATIENT INSTRUCTIONS
Make a return lab appointment for fasting labs when you have not had anything to eat for 8 hours prior and the only thing to drink is water.     Return papers for [place] and will work on getting you 2 weeks off of work

## 2018-09-29 ASSESSMENT — ANXIETY QUESTIONNAIRES: GAD7 TOTAL SCORE: 11

## 2018-09-29 ASSESSMENT — PATIENT HEALTH QUESTIONNAIRE - PHQ9: SUM OF ALL RESPONSES TO PHQ QUESTIONS 1-9: 13

## 2018-10-03 ENCOUNTER — TELEPHONE (OUTPATIENT)
Dept: FAMILY MEDICINE | Facility: CLINIC | Age: 58
End: 2018-10-03

## 2018-10-03 PROBLEM — F33.8 SEASONAL AFFECTIVE DISORDER (H): Status: ACTIVE | Noted: 2018-10-03

## 2018-10-03 NOTE — TELEPHONE ENCOUNTER
[place] returned call to address previous work done with client. Notes client minimizes safety concerns in the moment, and encouraged writer to be very specific when assessing safety. Reports client appears compliant and motivated in therapy. [place] believes at this time that weekly individual therapy seems an appropriate level of care.

## 2018-10-03 NOTE — TELEPHONE ENCOUNTER
Please call Hannah and let her know that I have completed her papers for FMLA.  We need her to come to the clinic and fill out her portion of the papers.  Also need her to sign a release of information as they are requesting a copy of her last office visit note.  Once she has signed a release of information and completed her portion of the paperwork we can fax it to her employer.    Jessica JONES

## 2018-10-03 NOTE — TELEPHONE ENCOUNTER
Pt has lab appt for tomorrow  then and will fill out forms then.  Placed at the .     Shelia Koenig, [place]

## 2018-10-04 DIAGNOSIS — I10 ESSENTIAL HYPERTENSION, BENIGN: ICD-10-CM

## 2018-10-04 LAB
ALBUMIN SERPL-MCNC: 3.7 G/DL (ref 3.4–5)
ALP SERPL-CCNC: 77 U/L (ref 40–150)
ALT SERPL W P-5'-P-CCNC: 40 U/L (ref 0–50)
ANION GAP SERPL CALCULATED.3IONS-SCNC: 6 MMOL/L (ref 3–14)
AST SERPL W P-5'-P-CCNC: 27 U/L (ref 0–45)
BILIRUB SERPL-MCNC: 0.4 MG/DL (ref 0.2–1.3)
BUN SERPL-MCNC: 15 MG/DL (ref 7–30)
CALCIUM SERPL-MCNC: 8.8 MG/DL (ref 8.5–10.1)
CHLORIDE SERPL-SCNC: 104 MMOL/L (ref 94–109)
CHOLEST SERPL-MCNC: 214 MG/DL
CO2 SERPL-SCNC: 29 MMOL/L (ref 20–32)
CREAT SERPL-MCNC: 0.65 MG/DL (ref 0.52–1.04)
CREAT UR-MCNC: 24 MG/DL
GFR SERPL CREATININE-BSD FRML MDRD: >90 ML/MIN/1.7M2
GLUCOSE SERPL-MCNC: 86 MG/DL (ref 70–99)
HDLC SERPL-MCNC: 50 MG/DL
LDLC SERPL CALC-MCNC: 141 MG/DL
MICROALBUMIN UR-MCNC: <5 MG/L
MICROALBUMIN/CREAT UR: NORMAL MG/G CR (ref 0–25)
NONHDLC SERPL-MCNC: 164 MG/DL
POTASSIUM SERPL-SCNC: 4 MMOL/L (ref 3.4–5.3)
PROT SERPL-MCNC: 7.5 G/DL (ref 6.8–8.8)
SODIUM SERPL-SCNC: 139 MMOL/L (ref 133–144)
TRIGL SERPL-MCNC: 114 MG/DL

## 2018-10-04 PROCEDURE — 80053 COMPREHEN METABOLIC PANEL: CPT | Performed by: NURSE PRACTITIONER

## 2018-10-04 PROCEDURE — 36415 COLL VENOUS BLD VENIPUNCTURE: CPT | Performed by: NURSE PRACTITIONER

## 2018-10-04 PROCEDURE — 82043 UR ALBUMIN QUANTITATIVE: CPT | Performed by: NURSE PRACTITIONER

## 2018-10-04 PROCEDURE — 80061 LIPID PANEL: CPT | Performed by: NURSE PRACTITIONER

## 2018-10-04 NOTE — TELEPHONE ENCOUNTER
Pt came in and filled out BRANDIE form and the short term disability forms we had, not the FMLA dorms  and then proceeded to drop off actual FMLA forms , forms given to Jesi to review and complete.    Shelia Koenig, [place]

## 2018-10-04 NOTE — TELEPHONE ENCOUNTER
FMLA forms have been completed  And faxed ,along with the short term disability forms, both set of forms were faxed to appropriate places and copies made of each and sent to scanning and put in fax bin, [employer]  mailed to pt home.     Shelia Koenig, [place]

## 2018-10-05 ENCOUNTER — OFFICE VISIT (OUTPATIENT)
Dept: PSYCHOLOGY | Facility: CLINIC | Age: 58
End: 2018-10-05
Payer: COMMERCIAL

## 2018-10-05 DIAGNOSIS — F33.2 SEVERE RECURRENT MAJOR DEPRESSION WITHOUT PSYCHOTIC FEATURES (H): Primary | ICD-10-CM

## 2018-10-05 PROCEDURE — 90791 PSYCH DIAGNOSTIC EVALUATION: CPT | Performed by: COUNSELOR

## 2018-10-05 NOTE — PROGRESS NOTES
"                                                                                                                                                                        Adult Intake Structured Interview  Standard Diagnostic Assessment      CLIENT'S NAME: Hannah Moreno  MRN:   [id number]  :   1960  ACCT. NUMBER: [id number]  DATE OF SERVICE: 18      Identifying Information:  Client is a 58 year old, ,  female. Client was referred for counseling by Jessica Lemus at [facility]. Client is currently employed full time as a  through [employer]. Client attended the session alone.      Client's Statement of Presenting Concern:  Client reports the reason for seeking therapy at this time as depression and anxiety symptoms.  Client stated that her symptoms have resulted in the following functional impairments: organization, relationship(s), self-care, social interactions and work / vocational responsibilities      History of Presenting Concern:  Client reports that anxiety symptoms began in 3 years following stressors at work (being declined for promotion, feeling rejected by boss). Client reports depression symptoms began around 5 years ago, and increases during winter months. Client has attempted to resolve these concerns in the past through counseling through [facility] and medications. Client reports that other professional(s) are involved in providing support / services.       Social History:  Client reported she grew up in [place], [place]. They were the first born of 3 children. Client reports finding out in her 20's that she also has 2 more older siblings. Parents  34 years ago when the client was 24 years old. The client's mother did not remarry and passed away from suicide. The client's father did not remarry and remains single. Client describes her childhood as, \"I was protected from doing a lot of things my siblings could do because I " "have a hearing disability; otherwise very \"normal\" childhood. Not a lot of physical affection with parents.\" Client described her current relationships with family of origin as \"father in 85, difficult interacting with him and tend to avoid him because he is needy.\" Client reports a decent relationship with siblings, outside of youngest brother due to a falling out years ago stemming from an intervention client staged for his alcohol abuse.    Client reported a history of 2 committed relationships or marriages. Client reports first marriage lasting 4 years, second marriage lasting 21 years. Client has been  for 5 years. Client reported having 1 children (Prakash- 36). Client identified few stable and meaningful social connections. Client reported that she has been involved with the legal system for 2 divorces. Client's highest education level was associate degree / vocational certificate. Client did identify the following learning problems: hearing and speech. There are no ethnic, cultural or Adventism factors that may be relevant for therapy. Client identified her preferred language to be English. Client reported she does not need the assistance of an  or other support involved in therapy. Modifications will not be used to assist communication in therapy. Client did not serve in the .     Client reports family history includes C.A.D. in her maternal grandfather; Cerebrovascular Disease in her maternal grandmother; Depression in her mother; HEART DISEASE in her mother; Hypertension in her brother, brother, sister, and son; Neurologic Disorder in her sister; Other Cancer in her brother; Thyroid Disease in her brother and mother; Unknown/Adopted in her father, paternal grandfather, and paternal grandmother.    Mental Health History:  Client reported the following biological family members or relatives with mental health issues: Mother experienced Depression and completed suicide and Uncle " experienced suicide.  Client previously received the following mental health diagnosis: Depression.  Client has received the following mental health services in the past: counseling and medication(s) from physician / PCP.  Hospitalizations: None.  Client is currently receiving the following services: medication(s) from physician / PCP.    Chemical Health History:  Client reported the following biological family members or relatives with chemical health issues: Brother reportedly uses alcohol . Client has not received chemical dependency treatment in the past. Client is not currently receiving any chemical dependency treatment. Client reports no problems as a result of their drinking / drug use.    Client Reports:  Client reports using alcohol 2 times per month and has 1 beers and glasses of wine at a time. Client first started drinking at age 14.  Client denies using tobacco.  Client denies using marijuana.  Client reports using caffeine 5 times per day and drinks 1 at a time. Client started using caffeine at age ~30.  Client denies using street drugs.  Client denies the non-medical use of prescription or over the counter drugs.    CAGE: None of the patient's responses to the CAGE screening were positive / Negative CAGE score   Based on the negative Cage-Aid score and clinical interview there  are not indications of drug or alcohol abuse.    Discussed the general effects of drugs and alcohol on health and well-being.      Significant Losses / Trauma / Abuse / Neglect Issues:  There are indications or report of significant loss, trauma, abuse or neglect issues related to: major medical problems - hearing disability since birth, divorce / relational changes divorce from both husbands, client s experience of emotional abuse - client identifies no intimacy with  for half of marriage (8-10 years) and client was present for the death of her mother- mother shot herself when client was outside, and client responded  (1987).     Issues of possible neglect are not present.      Medical Issues:  Client has had a physical exam to rule out medical causes for current symptoms. Date of last physical exam was within the past year. Client was encouraged to follow up with PCP if symptoms were to develop. The client has a [facility] Provider, who is named Donnell Buchanan. The client reports not having a psychiatrist. Client reports no current medical concerns. The client denies the presence of chronic or episodic pain. There are not significant nutritional concerns. Client reports losing 70 lbs last year, but fears that poor eating habits are increasing weight again.    Client reports current meds as:   Current Outpatient Prescriptions   Medication Sig     atenolol (TENORMIN) 25 MG tablet TAKE ONE-HALF TABLET BY MOUTH ONCE DAILY     escitalopram (LEXAPRO) 10 MG tablet Take 1 tablet (10 mg) by mouth daily     MULTIVITAMIN OR 2 tabs daily     order for DME Equipment being ordered: Please provide with SAD light (Patient not taking: Reported on 9/28/2018)     Probiotic Product (PROBIOTIC DAILY PO) Take 1 capsule by mouth 2 times daily.     No current facility-administered medications for this visit.        Client Allergies:  Allergies   Allergen Reactions     Amoxicillin Hives     Sulfa Drugs Hives     the following allergies to medications: See above    Medical History:  Past Medical History:   Diagnosis Date     Abnormal glandular Papanicolaou smear of cervix 1990     Leiomyoma of uterus, unspecified      Other specified cardiac dysrhythmias(427.89)      Paroxysmal supraventricular tachycardia (H)     2002-ablation tx     Unspecified hearing loss          Medication Adherence:  Client reports taking prescribed medications as prescribed.    Client was provided recommendation to follow-up with prescribing physician.    Mental Status Assessment:  Appearance:   Appropriate   Eye Contact:   Good   Psychomotor Behavior: Normal  "  Attitude:   Cooperative   Orientation:   All  Speech   Rate / Production: Normal    Volume:  Normal   Mood:    Normal  Affect:    Appropriate   Thought Content:  Clear   Thought Form:  Coherent  Logical   Insight:    Fair       Review of Symptoms:  Depression: Interest Guilt Energy Concentration Suicide Hopeless Helpless Worthless Ruminations Irritability. Client reports symptoms began around 5 years ago, and tends to leave in spring time. Client reports this episode has continued from last winter and is currently at severity level of severe.  Maria Luisa:  No symptoms  Psychosis: No symptoms  Anxiety: Nervousness, Worry, Rumination, difficulty staying asleep, irritability/anger (describes going from \"0-10\"), difficulty relaxing, muscle tension, restlessness. Triggers: Work stress, feeling like not meeting expectations, \"people pleaser\" or not feeling liked, fear of getting in an accident. Client reports anxiety levels at a mild-moderate level currently, and has been present for 3 years stemming from work stressors.   Panic:  No symptoms  Post Traumatic Stress Disorder: No symptoms  Obsessive Compulsive Disorder: No symptoms  Eating Disorder: No symptoms  Oppositional Defiant Disorder: No symptoms  ADD / ADHD: No symptoms  Conduct Disorder: No symptoms      Safety Assessment:    History of Safety Concerns:   Client reported a history of suicidal ideation.  Onset: 5 years ago and frequency: occurs usually in the winter. Client reports frequency as \"once\", however appears to have little insight into this and past sessions client has identified more than one occurrence. Client identified the following triggers to suicidal ideation: winter, bad meeting or email. Client reports describing SI to others as \"I'm drowning.\"  Client denied a history of suicide attempts.    Client denied a history of homicidal ideation.    Client denied a history of self-injurious ideation and behaviors.    Client denied a history of personal " safety concerns.    Client denied a history of assaultive behaviors.        Current Safety Concerns:  Client denies current suicidal ideation.  Client denies current homicidal ideation and behaviors.  Client denies current self-injurious ideation and behaviors.    Client denies current concerns for personal safety.    Client reports the following protective factors: spirituality, forward/future oriented thinking, dedication to family/friends, help seeking behaviors when distressed (crisis lines, reaching out to supports, seeking therapy), adherence with prescribed medication, agreement to use safety plan, healthy fear of pain, daily obligations, committment to well-being, sense of meaning, financial stability, sense of personal control or determination and her grandson and son.    Client reports there are no firearms in the house.     Plan for Safety and Risk Management:  A safety and risk management plan has been developed including: Client consented to co-developed safety plan.  [facility]'s safety and risk management plan was completed.  Client agreed to use safety plan should any safety concerns arise.  A copy was given to the patient.  Collaborative care team was informed of client's risk status and plan.    Client's Strengths and Limitations:  Client identified the following strengths or resources that will help her succeed in counseling: Jehovah's witness, friends / good social support and family support. Client identified the following supports: family, friends and co-workers. Things that may interfere with the client's success in counseling include: feelings of failure and rejection from family and boss.      Diagnostic Criteria:    A) Recurrent episode(s) - symptoms have been present during the same 2-week period and represent a change from previous functioning 5 or more symptoms (required for diagnosis)   - Depressed mood. Note: In children and adolescents, can be irritable mood.     - Diminished interest or pleasure in  all, or almost all, activities.    - Fatigue or loss of energy.    - Feelings of worthlessness or excessive guilt.    - Diminished ability to think or concentrate, or indecisiveness.    - Recurrent thoughts of death (not just fear of dying), recurrent suicidal ideation without a specific plan, or a suicide attempt or a specific plan for committing suicide.   B) The symptoms cause clinically significant distress or impairment in social, occupational, or other important areas of functioning  C) The episode is not attributable to the physiological effects of a substance or to another medical condition  D) The occurence of major depressive episode is not better explained by other thought / psychotic disorders  E) There has never been a manic episode or hypomanic episode      Functional Status:  Client's symptoms are causing reduced functional status in the following areas: Activities of Daily Living - decreased interest or motivation for self-care and task completion.  Occupational / Vocational - difficulty managing workload and interpersonal interactions in the workplace  Social / Relational - difficulty managing anger and interpersonal interactions that leave her feeling disliked      DSM5 Diagnoses: (Sustained by DSM5 Criteria Listed Above)  Diagnoses: 296.33 (F33.2) Major Depressive Disorder, Recurrent Episode, Severe With anxious distress and With seasonal pattern  Psychosocial & Contextual Factors: Work stressors, Family stressors, Recent move, Past trauma  WHODAS 2.0 (12 item)            This questionnaire asks about difficulties due to health conditions. Health conditions  include  disease or illnesses, other health problems that may be short or long lasting,  injuries, mental health or emotional problems, and problems with alcohol or drugs.                     Think back over the past 30 days and answer these questions, thinking about how much  difficulty you had doing the following activities. For each  question, please Cahto only  one response.    S1 Standing for long periods such as 30 minutes? None =         1   S2 Taking care of household responsibilities? None =         1   S3 Learning a new task, for example, learning how to get to a new place? None =         1   S4 How much of a problem do you have joining community activities (for example, festivals, Church or other activities) in the same way as anyone else can? Mild =           2   S5 How much have you been emotionally affected by your health problems? Moderate =   3     In the past 30 days, how much difficulty did you have in:   S6 Concentrating on doing something for ten minutes? Mild =           2   S7 Walking a long distance such as a kilometer (or equivalent)? None =         1   S8 Washing your whole body? None =         1   S9 Getting dressed? None =         1   S10 Dealing with people you do not know? None =         1   S11 Maintaining a friendship? None =         1   S12 Your day to day work? Moderate =   3     H1 Overall, in the past 30 days, how many days were these difficulties present? Record number of days 20   H2 In the past 30 days, for how many days were you totally unable to carry out your usual activities or work because of any health condition? Record number of days  1   H3 In the past 30 days, not counting the days that you were totally unable, for how many days did you cut back or reduce your usual activities or work because of any health condition? Record number of days 0     Attendance Agreement:  Client has signed Attendance Agreement:Yes      Collaboration:  The client is receiving treatment / structured support from the following professional(s) / service and treatment. Collaboration will be initiated with: primary care physician and previous counselor.      Preliminary Treatment Plan:  The client reports no currently identified Church, ethnic or cultural issues relevant to therapy.     services are not  indicated.    Modifications to assist communication are not indicated.    The concerns identified by the client will be addressed in therapy.    Initial Treatment will focus on: Depressed Mood   Anxiety   Risk Management / Safety Concerns related to: Suicidal ideation.    As a preliminary treatment goal, client will experience a reduction in depressed mood, will develop more effective coping skills to manage depressive symptoms, will develop healthy cognitive patterns and beliefs, will increase ability to function adaptively and will continue to take medications as prescribed / participate in supportive activities and services , will experience a reduction in anxiety, will develop more effective coping skills to manage anxiety symptoms, will develop healthy cognitive patterns and beliefs and will increase ability to function adaptively, will address relationship difficulties in a more adaptive manner and will develop strategies for more effective management of risk issues and will follow safety plan (in EMR) for more effective management of risk issues.    The focus of initial interventions will be to alleviate anxiety, alleviate depressed mood, facilitate appropriate expression of feelings, increase ability to function adaptively, increase coping skills, provide homework to reinforce skill development, teach anger management techniques, teach CBT skills, teach communication skills, teach conflict management skills, teach DBT skills, teach distress tolerance skills, teach emotional regulation, teach mindfulness skills, teach relaxation strategies and teach stress mangement techniques.    Referral to another professional/service is not indicated at this time.    A Release of Information has been obtained for the following: former [facility] counselor.    Report to child / adult protection services was NA.    Client will have access to their [facility]' medical record.    Saumya Javier MA, [facility]  10/5/18

## 2018-10-05 NOTE — MR AVS SNAPSHOT
MRN:[id number]                      After Visit Summary   10/5/2018    Hannah Moreno    MRN: [id number]           Visit Information        Provider Department      10/5/2018 8:30 AM Saumya Javier [facility] Generic      Your next 10 appointments already scheduled     Oct 12, 2018  7:30 AM CDT   Return Visit with Saumya Javier [facility] ([address] [id number] [phone]            Oct 19, 2018 11:30 AM CDT   Return Visit with Saumya Javier [facility] ([facility])    [address] [place] [id number] [phone]            Oct 24, 2018  8:00 AM CDT   Return Visit with Saumya Javier [facility] ([facility])    [address] [place] [id number] [phone]            Dec 28, 2018  8:00 AM CST   SHORT with KAIT Castano [facility] ([facility])    [address] [place] [id number] [phone]              MyChart Information     [employer] gives you secure access to your electronic health record. If you see a primary care provider, you can also send messages to your care team and make appointments. If you have questions, please call your primary care clinic.  If you do not have a primary care provider, please call [phone] and they will assist you.        Care EveryWhere ID     This is your Care EveryWhere ID. This could be used by other organizations to access your [facility] medical records  [id number]        Equal Access to Services     RAIN OHARA AH: Hadii aad ku hadasho Soomaali, waaxda luqadaha, qaybta kaalmada adeegyada, cheko redding. So [facility] [phone].    ATENCIÓN: Si habla español, tiene a doll disposición servicios gratuitos de asistencia lingüística. Llame al  [phone].    We comply with applicable federal civil rights laws and [place] laws. We do not discriminate on the basis of race, color, national origin, age, disability, sex, sexual orientation, or gender identity.

## 2018-10-12 ENCOUNTER — OFFICE VISIT (OUTPATIENT)
Dept: PSYCHOLOGY | Facility: CLINIC | Age: 58
End: 2018-10-12
Payer: COMMERCIAL

## 2018-10-12 DIAGNOSIS — F33.2 SEVERE RECURRENT MAJOR DEPRESSION WITHOUT PSYCHOTIC FEATURES (H): Primary | ICD-10-CM

## 2018-10-12 PROCEDURE — 90837 PSYTX W PT 60 MINUTES: CPT | Performed by: COUNSELOR

## 2018-10-12 NOTE — MR AVS SNAPSHOT
MRN:[id number]                      After Visit Summary   10/12/2018    Hannah Moreno    MRN: [id number]           Visit Information        Provider Department      10/12/2018 7:30 AM Saumya Javier [facility] Generic      Your next 10 appointments already scheduled     Oct 19, 2018 11:30 AM CDT   Return Visit with Saumya Javier [facility] ([address] [address]   [phone]            Oct 24, 2018  8:00 AM CDT   Return Visit with Saumya Javier [facility] ([facility])    [address] [place] [id number] [phone]            Nov 02, 2018  8:30 AM CDT   Return Visit with Saumya Javier [facility] ([facility])    [address] [place] [phone]            Dec 31, 2018 10:00 AM CST   SHORT with KAIT Castano [facility] ([facility])    [address] [place] [phone]              MyChart Information     [employer] gives you secure access to your electronic health record. If you see a primary care provider, you can also send messages to your care team and make appointments. If you have questions, please call your primary care clinic.  If you do not have a primary care provider, please call [phone] and they will assist you.        Care EveryWhere ID     This is your Care EveryWhere ID. This could be used by other organizations to access your [place] medical records  [id number]        Equal Access to Services     RAIN OHARA AH: Hadii aad ku hadasho Soomaali, waaxda luqadaha, qaybta kaalmada adeegyada, cheko redding. So [facility] [phone].    ATENCIÓN: Si habla español, tiene a doll disposición servicios gratuitos de asistencia lingüística. Llame al  [phone].    We comply with applicable federal civil rights laws and [place] laws. We do not discriminate on the basis of race, color, national origin, age, disability, sex, sexual orientation, or gender identity.

## 2018-10-12 NOTE — PROGRESS NOTES
Progress Note    Client Name: Hannah Moreno  Date: 10/12/18         Service Type: Individual      Session Start Time: 7:30 am  Session End Time: 8:23 am      Session Length: 53 min     Session #: 4     Attendees: Client attended alone    Treatment Plan Last Reviewed: 10/12/18 (Review by 1/12/18)  PHQ-9 / KATHRYN-7 : ---     DATA      Progress Since Last Session (Related to Symptoms / Goals / Homework):   Symptoms: Improving - Client reports depression symptoms have lifted slightly     Homework: n/a      Episode of Care Goals: Treatment goals developed in today's session.     Current / Ongoing Stressors and Concerns:   Ongoing: Work stressors, conflict with family members     Treatment Objective(s) Addressed in This Session:   Treatment goals developed in today's session.     Intervention:   DBT: Offered information on Emotions Cycle, and reflected pattern of distorted interpretations that may influence emotions and interpersonal interactions.  Solution Focused: Assisted with goal setting.        ASSESSMENT: Current Emotional / Mental Status (status of significant symptoms):   Risk status (Self / Other harm or suicidal ideation)   Client denies current fears or concerns for personal safety.   Client denies current or recent suicidal ideation or behaviors.   Client denies current or recent homicidal ideation or behaviors.   Client denies current or recent self injurious behavior or ideation.   Client denies other safety concerns.   Client Client reports there has been no change in risk factors since their last session.     Client Client reports there has been no change in protective factors since their last session.     A safety and risk management plan has been developed including: Client consented to co-developed safety plan.  [facility]'s safety and risk management plan was completed.  Client agreed to use safety plan should any safety concerns arise.  A copy was given to the  "patient.  Collaborative care team was informed of client's risk status and plan.     Appearance:   Appropriate    Eye Contact:   Good    Psychomotor Behavior: Normal    Attitude:   Cooperative    Orientation:   All   Speech    Rate / Production: Normal     Volume:  Loud    Mood:    Normal   Affect:    Appropriate    Thought Content:  Clear    Thought Form:  Coherent  Logical    Insight:    Fair      Medication Review:   No changes to current psychiatric medication(s)     Medication Compliance:   Yes     Changes in Health Issues:   None reported     Chemical Use Review:   Substance Use: Chemical use reviewed, no active concerns identified      Tobacco Use: No current tobacco use.       Collateral Reports Completed:   Not Applicable    PLAN: (Client Tasks / Therapist Tasks / Other)  Client is assigned to engage in one positive activity outside of the home, and begin listing positive activities that she may engage in as the weather begins to change. Client reports plan to seek a \"happy light\" from her doctor this week.         Saumya Javier MA, [facility]                                                          ________________________________________________________________________    Treatment Plan    Client's Name: Hannah Moreno  YOB: 1960    Date: 10/12/18    Diagnoses:            296.33 (F33.2) Major Depressive Disorder, Recurrent Episode, Severe With anxious distress and With seasonal pattern  Psychosocial & Contextual Factors: Work stressors, Family stressors, Recent move, Past trauma  WHODAS: 18    Referral / Collaboration:  Referral to another professional/service is not indicated at this time.    Anticipated number of session or this episode of care: 30      MeasurableTreatment Goal(s) related to diagnosis / functional impairment(s)  Goal 1: Client will maintain safety.    Objective #A (Client Action)    Client will identify any SI thoughts, respond by using Safety Plan, and report on safety each " session..  Status: New - Date: 10/12/18     Intervention(s)  Therapist will assist with Safety Plan development and reinforce effective use of skills and supports..    Goal 2: Client will decrease depression symptoms as evidence by PHQ-9 scores.    Objective #A (Client Action)    Status: New - Date: 10/12/18     Client will Identify negative self-talk and behaviors: challenge core beliefs, myths, and actions.    Intervention(s)  Therapist will assign homework    teach emotional recognition/identification. Assist with identifying and tracking triggers and patterns. Utilize Emotions Cycle to challenge ineffective thought and behavior patterns that perpetuate depression symptoms.  on Distress Tolerance techniques to manage emotional dysregulation.    Objective #B  Client will engage in an intentional self-care activity daily.    Status: New - Date: 10/12/18     Intervention(s)  Therapist will assign homework    teach emotional regulation skills.      Objective #C  Client will learn and practice Interpersonal Effectiveness techniques to more effectively respond to conflict within relationships..  Status: New - Date: 10/12/18     Intervention(s)  Therapist will assign homework    role-play conflict management  teach Interpersonal Effectiveness skills, including effective assertiveness and validation skills..    Client has reviewed and agreed to the above plan.      Saumya Javier MA, [facility]  October 12, 2018

## 2018-10-19 ENCOUNTER — TELEPHONE (OUTPATIENT)
Dept: FAMILY MEDICINE | Facility: CLINIC | Age: 58
End: 2018-10-19

## 2018-10-19 ENCOUNTER — OFFICE VISIT (OUTPATIENT)
Dept: PSYCHOLOGY | Facility: CLINIC | Age: 58
End: 2018-10-19
Payer: COMMERCIAL

## 2018-10-19 DIAGNOSIS — F33.2 SEVERE RECURRENT MAJOR DEPRESSION WITHOUT PSYCHOTIC FEATURES (H): Primary | ICD-10-CM

## 2018-10-19 PROCEDURE — 90834 PSYTX W PT 45 MINUTES: CPT | Performed by: COUNSELOR

## 2018-10-19 ASSESSMENT — ANXIETY QUESTIONNAIRES
7. FEELING AFRAID AS IF SOMETHING AWFUL MIGHT HAPPEN: NOT AT ALL
6. BECOMING EASILY ANNOYED OR IRRITABLE: SEVERAL DAYS
IF YOU CHECKED OFF ANY PROBLEMS ON THIS QUESTIONNAIRE, HOW DIFFICULT HAVE THESE PROBLEMS MADE IT FOR YOU TO DO YOUR WORK, TAKE CARE OF THINGS AT HOME, OR GET ALONG WITH OTHER PEOPLE: NOT DIFFICULT AT ALL
3. WORRYING TOO MUCH ABOUT DIFFERENT THINGS: SEVERAL DAYS
GAD7 TOTAL SCORE: 2
2. NOT BEING ABLE TO STOP OR CONTROL WORRYING: NOT AT ALL
5. BEING SO RESTLESS THAT IT IS HARD TO SIT STILL: NOT AT ALL
1. FEELING NERVOUS, ANXIOUS, OR ON EDGE: NOT AT ALL

## 2018-10-19 ASSESSMENT — PATIENT HEALTH QUESTIONNAIRE - PHQ9: 5. POOR APPETITE OR OVEREATING: NOT AT ALL

## 2018-10-19 NOTE — PROGRESS NOTES
"                                             Progress Note    Client Name: Hannah Moreno  Date: 10/19/18         Service Type: Individual      Session Start Time: 11:30 am  Session End Time: 12:18 pm      Session Length: 48 min     Session #: 5     Attendees: Client attended alone    Treatment Plan Last Reviewed: 10/12/18 (Review by 1/12/18)  PHQ-9 / KATHRYN-7 :      DATA      Progress Since Last Session (Related to Symptoms / Goals / Homework):   Symptoms: Improving - Client reports depression symptoms have lifted slightly     Homework: Achieved / completed to satisfaction - Client reports going for walks, as well as getting and using \"happy light\".      Episode of Care Goals: Minimal progress - ACTION (Actively working towards change); Intervened by reinforcing change plan / affirming steps taken. Client is at the beginning stages of this episode of care.     Current / Ongoing Stressors and Concerns:   Ongoing: Work stressors, conflict with family members   Current:     Treatment Objective(s) Addressed in This Session:   Client will Identify negative self-talk and behaviors: challenge core beliefs, myths, and actions.  Client will engage in an intentional self-care activity daily.      Intervention:   CBT: Identified areas of self-care and distress tolerance that client may find helpful when transitioning into work including: sleep hygiene, taking effective breaks, mindful eating, and engaging in positive activities.  DBT: Discussed use of Emotions Cycle, and how client identified ineffective interpretations in the moment and challenged with the facts.  Motivational Interviewing: Highlighted change language and reinforced effective use of skills.        ASSESSMENT: Current Emotional / Mental Status (status of significant symptoms):   Risk status (Self / Other harm or suicidal ideation)   Client denies current fears or concerns for personal safety.   Client denies current or recent suicidal ideation or " behaviors.   Client denies current or recent homicidal ideation or behaviors.   Client denies current or recent self injurious behavior or ideation.   Client denies other safety concerns.   Client Client reports there has been no change in risk factors since their last session.     Client Client reports there has been no change in protective factors since their last session.     A safety and risk management plan has been developed including: Client consented to co-developed safety plan.  [facility]'s safety and risk management plan was completed.  Client agreed to use safety plan should any safety concerns arise.  A copy was given to the patient.  Collaborative care team was informed of client's risk status and plan.     Appearance:   Appropriate    Eye Contact:   Good    Psychomotor Behavior: Normal    Attitude:   Cooperative    Orientation:   All   Speech    Rate / Production: Normal     Volume:  Loud    Mood:    Normal   Affect:    Appropriate    Thought Content:  Clear    Thought Form:  Coherent  Logical    Insight:    Fair      Medication Review:   No changes to current psychiatric medication(s)     Medication Compliance:   Yes     Changes in Health Issues:   None reported     Chemical Use Review:   Substance Use: Chemical use reviewed, no active concerns identified      Tobacco Use: No current tobacco use.       Collateral Reports Completed:   Not Applicable    PLAN: (Client Tasks / Therapist Tasks / Other)  Client is assigned to look over Distress Tolerance packet, as well as engage in self-care plan in preparation for returning to work.         Saumya Javier MA, [facility]                                                          ________________________________________________________________________    Treatment Plan    Client's Name: Hannah Moreno  YOB: 1960    Date: 10/12/18    Diagnoses:            296.33 (F33.2) Major Depressive Disorder, Recurrent Episode, Severe With anxious distress and With  seasonal pattern  Psychosocial & Contextual Factors: Work stressors, Family stressors, Recent move, Past trauma  WHODAS: 18    Referral / Collaboration:  Referral to another professional/service is not indicated at this time.    Anticipated number of session or this episode of care: 30      MeasurableTreatment Goal(s) related to diagnosis / functional impairment(s)  Goal 1: Client will maintain safety.    Objective #A (Client Action)    Client will identify any SI thoughts, respond by using Safety Plan, and report on safety each session..  Status: New - Date: 10/12/18     Intervention(s)  Therapist will assist with Safety Plan development and reinforce effective use of skills and supports..    Goal 2: Client will decrease depression symptoms as evidence by PHQ-9 scores.    Objective #A (Client Action)    Status: New - Date: 10/12/18     Client will Identify negative self-talk and behaviors: challenge core beliefs, myths, and actions.    Intervention(s)  Therapist will assign homework    teach emotional recognition/identification. Assist with identifying and tracking triggers and patterns. Utilize Emotions Cycle to challenge ineffective thought and behavior patterns that perpetuate depression symptoms.  on Distress Tolerance techniques to manage emotional dysregulation.    Objective #B  Client will engage in an intentional self-care activity daily.    Status: New - Date: 10/12/18     Intervention(s)  Therapist will assign homework    teach emotional regulation skills.      Objective #C  Client will learn and practice Interpersonal Effectiveness techniques to more effectively respond to conflict within relationships..  Status: New - Date: 10/12/18     Intervention(s)  Therapist will assign homework    role-play conflict management  teach Interpersonal Effectiveness skills, including effective assertiveness and validation skills..    Client has reviewed and agreed to the above plan.      Saumya Javier MA, [facility]   October 19, 2018

## 2018-10-19 NOTE — MR AVS SNAPSHOT
MRN:[id number]                      After Visit Summary   10/19/2018    Hannah Moreno    MRN: [id number]           Visit Information        Provider Department      10/19/2018 11:30 AM Saumya Javier [facility] Generic      Your next 10 appointments already scheduled     Oct 24, 2018  8:00 AM CDT   Return Visit with Saumya Javier [facility] ([address] [id number]   [phone]            Nov 02, 2018  8:30 AM CDT   Return Visit with Saumya Javier [facility] ([facility])    [address] [place] [id number] [phone]            Nov 09, 2018  7:30 AM CST   Return Visit with Saumya Javier [facility] ([facility])    [address] [place] [id number] [phone]            Dec 31, 2018 10:00 AM CST   SHORT with KAIT Castano [facility] ([facility])    [address] [place] [id number] [phone]              MyChart Information     [employer] gives you secure access to your electronic health record. If you see a primary care provider, you can also send messages to your care team and make appointments. If you have questions, please call your primary care clinic.  If you do not have a primary care provider, please call [phone] and they will assist you.        Care EveryWhere ID     This is your Care EveryWhere ID. This could be used by other organizations to access your [facility] medical records  [id number]        Equal Access to Services     RAIN OHARA AH: Hadii aad ku hadasho Soomaali, waaxda luqadaha, qaybta kaalmada adeegyada, cheko redding. So [facility] [phone].    ATENCIÓN: Si habla español, tiene a doll disposición servicios gratuitos de asistencia lingüística. Llame al  [phone].    We comply with applicable federal civil rights laws and [place] laws. We do not discriminate on the basis of race, color, national origin, age, disability, sex, sexual orientation, or gender identity.

## 2018-10-19 NOTE — TELEPHONE ENCOUNTER
Pt requesting return to work letter to fax to [phone] hopefully by 10/22.    Argentina Acevedo, [place]

## 2018-10-20 ASSESSMENT — ANXIETY QUESTIONNAIRES: GAD7 TOTAL SCORE: 2

## 2018-10-20 ASSESSMENT — PATIENT HEALTH QUESTIONNAIRE - PHQ9: SUM OF ALL RESPONSES TO PHQ QUESTIONS 1-9: 4

## 2018-10-24 ENCOUNTER — OFFICE VISIT (OUTPATIENT)
Dept: PSYCHOLOGY | Facility: CLINIC | Age: 58
End: 2018-10-24
Payer: COMMERCIAL

## 2018-10-24 DIAGNOSIS — F33.2 SEVERE RECURRENT MAJOR DEPRESSION WITHOUT PSYCHOTIC FEATURES (H): Primary | ICD-10-CM

## 2018-10-24 PROCEDURE — 90834 PSYTX W PT 45 MINUTES: CPT | Performed by: COUNSELOR

## 2018-10-24 NOTE — PROGRESS NOTES
"                                             Progress Note    Client Name: Hannah Moreno  Date: 10/24/18         Service Type: Individual      Session Start Time: 8:00 am  Session End Time: 8:50 pm      Session Length: 50 min     Session #: 6     Attendees: Client attended alone    Treatment Plan Last Reviewed: 10/12/18 (Review by 1/12/18)  PHQ-9 / KATHRYN-7 : ---     DATA      Progress Since Last Session (Related to Symptoms / Goals / Homework):   Symptoms: No change - Client reports spike in distress when returning to work yesterday.      Homework: Achieved / completed to satisfaction - Client reports going for walks, as well as getting and using \"happy light\".      Episode of Care Goals: Minimal progress - ACTION (Actively working towards change); Intervened by reinforcing change plan / affirming steps taken. Client is at the beginning stages of this episode of care.     Current / Ongoing Stressors and Concerns:   Ongoing: Work stressors, conflict with family members   Current:     Treatment Objective(s) Addressed in This Session:   Client will Identify negative self-talk and behaviors: challenge core beliefs, myths, and actions.  Client will identify any SI thoughts, respond by using Safety Plan, and report on safety each session.     Intervention:   CBT: Reflected pattern of interpreting others' actions as an attack, or that this speaks to client's self-worth. Challenged with positive statements and utilizing the facts.  DBT: Completed Safety assessment and developed Safety Plan. Offered assertiveness handout to client to assist in prepping for meeting with supervisor next week.  Motivational Interviewing: Highlighted change language and reinforced effective use of skills.        ASSESSMENT: Current Emotional / Mental Status (status of significant symptoms):   Risk status (Self / Other harm or suicidal ideation)   Client denies current fears or concerns for personal safety.   Client reports the following current " or recent suicidal ideation or behaviors: Moderate levels of SI yesterday and this morning. Client denies planning or intent. Client commits to safety in session, and made plan including going for a walk, using ice, listening to music, petting cat..   Client denies current or recent homicidal ideation or behaviors.   Client denies current or recent self injurious behavior or ideation.   Client denies other safety concerns.   Client Client reports there has been no change in risk factors since their last session.     Client Client reports there has been no change in protective factors since their last session.     A safety and risk management plan has been developed including: Client consented to co-developed safety plan.  [facility]'s safety and risk management plan was completed.  Client agreed to use safety plan should any safety concerns arise.  A copy was given to the patient.  Collaborative care team was informed of client's risk status and plan.     Appearance:   Appropriate    Eye Contact:   Good    Psychomotor Behavior: Normal    Attitude:   Cooperative    Orientation:   All   Speech    Rate / Production: Normal     Volume:  Loud    Mood:    Normal   Affect:    Appropriate    Thought Content:  Clear    Thought Form:  Coherent  Logical    Insight:    Fair      Medication Review:   No changes to current psychiatric medication(s)     Medication Compliance:   Yes     Changes in Health Issues:   None reported     Chemical Use Review:   Substance Use: Chemical use reviewed, no active concerns identified      Tobacco Use: No current tobacco use.       Collateral Reports Completed:   Not Applicable    PLAN: (Client Tasks / Therapist Tasks / Other)  Discussed increasing level of care for additional skills coached, client denies at this time.  Client is assigned to use Safety Plan to manage dysregulation.         Saumya Javier MA, [facility]                                                           ________________________________________________________________________    Treatment Plan    Client's Name: Hannah Moreno  YOB: 1960    Date: 10/12/18    Diagnoses:            296.33 (F33.2) Major Depressive Disorder, Recurrent Episode, Severe With anxious distress and With seasonal pattern  Psychosocial & Contextual Factors: Work stressors, Family stressors, Recent move, Past trauma  WHODAS: 18    Referral / Collaboration:  Referral to another professional/service is not indicated at this time.    Anticipated number of session or this episode of care: 30      MeasurableTreatment Goal(s) related to diagnosis / functional impairment(s)  Goal 1: Client will maintain safety.    Objective #A (Client Action)    Client will identify any SI thoughts, respond by using Safety Plan, and report on safety each session.  Status: New - Date: 10/12/18     Intervention(s)  Therapist will assist with Safety Plan development and reinforce effective use of skills and supports.    Goal 2: Client will decrease depression symptoms as evidence by PHQ-9 scores.    Objective #A (Client Action)    Status: New - Date: 10/12/18     Client will Identify negative self-talk and behaviors: challenge core beliefs, myths, and actions.    Intervention(s)  Therapist will assign homework    teach emotional recognition/identification. Assist with identifying and tracking triggers and patterns. Utilize Emotions Cycle to challenge ineffective thought and behavior patterns that perpetuate depression symptoms.  on Distress Tolerance techniques to manage emotional dysregulation.    Objective #B  Client will engage in an intentional self-care activity daily.    Status: New - Date: 10/12/18     Intervention(s)  Therapist will assign homework    teach emotional regulation skills.      Objective #C  Client will learn and practice Interpersonal Effectiveness techniques to more effectively respond to conflict within  relationships..  Status: New - Date: 10/12/18     Intervention(s)  Therapist will assign homework    role-play conflict management  teach Interpersonal Effectiveness skills, including effective assertiveness and validation skills..    Client has reviewed and agreed to the above plan.      Saumya Javier MA, [facility]  October 24, 2018

## 2018-10-24 NOTE — MR AVS SNAPSHOT
MRN:[id number]                      After Visit Summary   10/24/2018    Hannah Moreno    MRN: [id number]           Visit Information        Provider Department      10/24/2018 8:00 AM Saumya Javier [facility] Generic      Your next 10 appointments already scheduled     Nov 02, 2018  8:30 AM CDT   Return Visit with Saumya Javier [facility] ([facility])    [address] [place] [phone]   [phone]            Nov 09, 2018  7:30 AM CST   Return Visit with Saumya Javier [facility] ([facility])    [address] [place] [phone]   [phone]            Dec 31, 2018 10:00 AM CST   SHORT with KAIT Castano [facility] ([facility])    [address] [facility] [id number] [phone]              Care Instructions    HOMEWORK:    *Ice  *Breathing  *Pet cat  *Go for a walk  *Music  *Diffusor           MyChart Information     [employer] gives you secure access to your electronic health record. If you see a primary care provider, you can also send messages to your care team and make appointments. If you have questions, please call your primary care clinic.  If you do not have a primary care provider, please call [phone] and they will assist you.        Care EveryWhere ID     This is your Care EveryWhere ID. This could be used by other organizations to access your [facility] medical records  [id number]        Equal Access to Services     RAIN OHARA : Hadii mery cowan hadasho Soomaali, waaxda luqadaha, qaybta kaalmada adeegyada, cheko redding. So [facility] [phone].    ATENCIÓN: Si habla español, tiene a doll disposición servicios gratuitos de asistencia lingüística. Llame al [phone].    We comply with applicable federal civil rights laws and [place] laws. We do not  discriminate on the basis of race, color, national origin, age, disability, sex, sexual orientation, or gender identity.

## 2018-11-14 ENCOUNTER — OFFICE VISIT (OUTPATIENT)
Dept: PSYCHOLOGY | Facility: CLINIC | Age: 58
End: 2018-11-14
Payer: COMMERCIAL

## 2018-11-14 DIAGNOSIS — F33.2 MAJOR DEPRESSIVE DISORDER, RECURRENT EPISODE, SEVERE WITH ANXIOUS DISTRESS (H): Primary | ICD-10-CM

## 2018-11-14 PROCEDURE — 90834 PSYTX W PT 45 MINUTES: CPT | Performed by: COUNSELOR

## 2018-11-14 ASSESSMENT — ANXIETY QUESTIONNAIRES
1. FEELING NERVOUS, ANXIOUS, OR ON EDGE: NOT AT ALL
6. BECOMING EASILY ANNOYED OR IRRITABLE: NOT AT ALL
7. FEELING AFRAID AS IF SOMETHING AWFUL MIGHT HAPPEN: NOT AT ALL
3. WORRYING TOO MUCH ABOUT DIFFERENT THINGS: NOT AT ALL
IF YOU CHECKED OFF ANY PROBLEMS ON THIS QUESTIONNAIRE, HOW DIFFICULT HAVE THESE PROBLEMS MADE IT FOR YOU TO DO YOUR WORK, TAKE CARE OF THINGS AT HOME, OR GET ALONG WITH OTHER PEOPLE: NOT DIFFICULT AT ALL
GAD7 TOTAL SCORE: 0
2. NOT BEING ABLE TO STOP OR CONTROL WORRYING: NOT AT ALL
5. BEING SO RESTLESS THAT IT IS HARD TO SIT STILL: NOT AT ALL

## 2018-11-14 ASSESSMENT — PATIENT HEALTH QUESTIONNAIRE - PHQ9
SUM OF ALL RESPONSES TO PHQ QUESTIONS 1-9: 3
5. POOR APPETITE OR OVEREATING: NOT AT ALL

## 2018-11-14 NOTE — MR AVS SNAPSHOT
MRN:[id number]                      After Visit Summary   11/14/2018    Hannah Moreno    MRN: [id number]           Visit Information        Provider Department      11/14/2018 8:00 AM Saumya Javier [facility] Generic      Your next 10 appointments already scheduled     Nov 20, 2018  4:00 PM CST   Return Visit with Saumya Javier [facility] ([facility])    [address] [place] [address]   [phone]            Nov 27, 2018  8:00 AM CST   Return Visit with Saumya Javier [facility] ([facility])    [address] [place] [address]   [phone]            Dec 07, 2018  7:30 AM CST   [facility]    [address] [place] [id number] [phone]            Dec 14, 2018  7:30 AM CST   Return Visit with Saumya Javier [facility] ([facility])    [address] [place] [address]   [phone]            Dec 31, 2018 10:00 AM CST   SHORT with KAIT Castano [facility] ([facility])    [address] [place] [id number] [phone]              MyChart Information     [employer] gives you secure access to your electronic health record. If you see a primary care provider, you can also send messages to your care team and make appointments. If you have questions, please call your primary care clinic.  If you do not have a primary care provider, please call [phone] and they will assist you.        Care EveryWhere ID     This is your Care EveryWhere ID. This could be used by other organizations to access your [place] medical records  [id number]        Equal Access to Services     RAIN OHARA AH: Fariha Sewell, cecil jones, zach mattson  cheko lujan ah. So [facility] [phone].    ATENCIÓN: Si habla español, tiene a doll disposición servicios gratuitos de asistencia lingüística. Llame al [phone].    We comply with applicable federal civil rights laws and [place] laws. We do not discriminate on the basis of race, color, national origin, age, disability, sex, sexual orientation, or gender identity.

## 2018-11-14 NOTE — PROGRESS NOTES
Progress Note    Client Name: Hannah Moreno  Date: 11/14/18         Service Type: Individual      Session Start Time: 8:00 am  Session End Time: 8:47 pm      Session Length: 47 min     Session #: 7     Attendees: Client attended alone    Treatment Plan Last Reviewed: 10/12/18 (Review by 1/12/18)  PHQ-9 / KATHRYN-7 : 3 / 0     DATA      Progress Since Last Session (Related to Symptoms / Goals / Homework):   Symptoms: Improving       Homework: Achieved / completed to satisfaction       Episode of Care Goals: Satisfactory progress - ACTION (Actively working towards change); Intervened by reinforcing change plan / affirming steps taken.      Current / Ongoing Stressors and Concerns:   Ongoing: Work stressors, conflict with family members   Current: Client reports continued work stress and interpersonal conflict with boss. Discussed personalization tendencies that increase distress and defensiveness in the workplace. Client reports beginning a gun safety class to aid in decreasing trauma response to guns stemming from mother's suicide.      Treatment Objective(s) Addressed in This Session:   Client will Identify negative self-talk and behaviors: challenge core beliefs, myths, and actions.  Client will learn and practice Interpersonal Effectiveness techniques to more effectively respond to conflict within relationships.     Intervention:   CBT: Reflected pattern of interpreting others' actions as an attack, or that this speaks to client's self-worth. Challenged with positive statements and utilizing the facts.  DBT: Coached on use of Emotions Cycle to influence emotional experience and identify more effective responses in the workplace.  Motivational Interviewing: Highlighted change language        ASSESSMENT: Current Emotional / Mental Status (status of significant symptoms):   Risk status (Self / Other harm or suicidal ideation)   Client denies current fears or concerns for  personal safety.   Client reports the following current or recent suicidal ideation or behaviors: Moderate levels of SI yesterday and this morning. Client denies planning or intent. Client commits to safety in session, and made plan including going for a walk, using ice, listening to music, petting cat..   Client denies current or recent homicidal ideation or behaviors.   Client denies current or recent self injurious behavior or ideation.   Client denies other safety concerns.   Client Client reports there has been no change in risk factors since their last session.     Client Client reports there has been no change in protective factors since their last session.     A safety and risk management plan has been developed including: Client consented to co-developed safety plan.  [facility]'s safety and risk management plan was completed.  Client agreed to use safety plan should any safety concerns arise.  A copy was given to the patient.  Collaborative care team was informed of client's risk status and plan.     Appearance:   Appropriate    Eye Contact:   Good    Psychomotor Behavior: Normal    Attitude:   Cooperative    Orientation:   All   Speech    Rate / Production: Normal     Volume:  Loud    Mood:    Normal   Affect:    Appropriate    Thought Content:  Clear    Thought Form:  Coherent  Logical    Insight:    Fair      Medication Review:   No changes to current psychiatric medication(s)     Medication Compliance:   Yes     Changes in Health Issues:   None reported     Chemical Use Review:   Substance Use: Chemical use reviewed, no active concerns identified      Tobacco Use: No current tobacco use.       Collateral Reports Completed:   Not Applicable    PLAN: (Client Tasks / Therapist Tasks / Other)  Client was assigned to increase awareness of how gun safety classes may be effecting distress levels. Client was given Cognitive Distortions handout to review.         Saumya Javier MA, [facility]                                                           ________________________________________________________________________    Treatment Plan    Client's Name: Hannah Moreno  YOB: 1960    Date: 10/12/18    Diagnoses:            296.33 (F33.2) Major Depressive Disorder, Recurrent Episode, Severe With anxious distress and With seasonal pattern  Psychosocial & Contextual Factors: Work stressors, Family stressors, Recent move, Past trauma  WHODAS: 18    Referral / Collaboration:  Referral to another professional/service is not indicated at this time.    Anticipated number of session or this episode of care: 30      MeasurableTreatment Goal(s) related to diagnosis / functional impairment(s)  Goal 1: Client will maintain safety.    Objective #A (Client Action)    Client will identify any SI thoughts, respond by using Safety Plan, and report on safety each session.  Status: New - Date: 10/12/18     Intervention(s)  Therapist will assist with Safety Plan development and reinforce effective use of skills and supports.    Goal 2: Client will decrease depression symptoms as evidence by PHQ-9 scores.    Objective #A (Client Action)    Status: New - Date: 10/12/18     Client will Identify negative self-talk and behaviors: challenge core beliefs, myths, and actions.    Intervention(s)  Therapist will assign homework    teach emotional recognition/identification. Assist with identifying and tracking triggers and patterns. Utilize Emotions Cycle to challenge ineffective thought and behavior patterns that perpetuate depression symptoms.  on Distress Tolerance techniques to manage emotional dysregulation.    Objective #B  Client will engage in an intentional self-care activity daily.    Status: New - Date: 10/12/18     Intervention(s)  Therapist will assign homework    teach emotional regulation skills.      Objective #C  Client will learn and practice Interpersonal Effectiveness techniques to more effectively respond to conflict within  relationships.  Status: New - Date: 10/12/18     Intervention(s)  Therapist will assign homework    role-play conflict management  teach Interpersonal Effectiveness skills, including effective assertiveness and validation skills..    Client has reviewed and agreed to the above plan.      Sa[facility]ya Javier MA, [facility]  11/14/18

## 2018-11-15 ASSESSMENT — ANXIETY QUESTIONNAIRES: GAD7 TOTAL SCORE: 0

## 2018-11-20 ENCOUNTER — OFFICE VISIT (OUTPATIENT)
Dept: PSYCHOLOGY | Facility: CLINIC | Age: 58
End: 2018-11-20
Payer: COMMERCIAL

## 2018-11-20 DIAGNOSIS — F33.2 MAJOR DEPRESSIVE DISORDER, RECURRENT EPISODE, SEVERE WITH ANXIOUS DISTRESS (H): Primary | ICD-10-CM

## 2018-11-20 PROCEDURE — 90834 PSYTX W PT 45 MINUTES: CPT | Performed by: COUNSELOR

## 2018-11-20 NOTE — MR AVS SNAPSHOT
MRN:[id number]                      After Visit Summary   11/20/2018    Hannah Moreno    MRN: [id number]           Visit Information        Provider Department      11/20/2018 4:00 PM Saumya Javier [facility] Generic      Your next 10 appointments already scheduled     Nov 27, 2018  8:00 AM CST   Return Visit with Saumya Javier [facility] ([address] [address]   [phone]            Dec 07, 2018  7:30 AM CST   Return Visit with Saumya Javier [facility] ([facility])    [address] [place] [id number] [phone]            Dec 14, 2018  7:30 AM CST   Return Visit with Saumya Javier [facility] ([facility])    [address] [place] [id number] [phone]            Dec 31, 2018 10:00 AM CST   SHORT with KAIT Castano [facility] ([facility])    [address] [place] [id number] [phone]              MyChart Information     [employer] gives you secure access to your electronic health record. If you see a primary care provider, you can also send messages to your care team and make appointments. If you have questions, please call your primary care clinic.  If you do not have a primary care provider, please call [phone] and they will assist you.        Care EveryWhere ID     This is your Care EveryWhere ID. This could be used by other organizations to access your [place] medical records  [id number]        Equal Access to Services     RAIN OHARA : Hadii aad chapo ramírez Sogiacomo, waaxda luqadaha, qaybta kaalmada adeegyada, cheko redding. So [facility] [phone].    ATENCIÓN: Si habla español, tiene a doll disposición servicios gratuitos de asistencia lingüística. Llame al  [phone].    We comply with applicable federal civil rights laws and [place] laws. We do not discriminate on the basis of race, color, national origin, age, disability, sex, sexual orientation, or gender identity.

## 2018-11-20 NOTE — PROGRESS NOTES
Progress Note    Client Name: Hannah Moreno  Date: 11/20/18         Service Type: Individual      Session Start Time: 4:00 pm Session End Time: 4:45 pm      Session Length: 45 min     Session #: 8     Attendees: Client attended alone    Treatment Plan Last Reviewed: 10/12/18 (Review by 1/12/18)  PHQ-9 / KATHRYN-7 : ---     DATA      Progress Since Last Session (Related to Symptoms / Goals / Homework):   Symptoms: Improving       Homework: Achieved / completed to satisfaction       Episode of Care Goals: Satisfactory progress - ACTION (Actively working towards change); Intervened by reinforcing change plan / affirming steps taken.      Current / Ongoing Stressors and Concerns:   Ongoing: Work stressors, conflict with family members   Current: Client reports continued work stress and interpersonal conflict with boss. Discussed personalization tendencies that increase distress and defensiveness in the workplace. Client reports beginning a gun safety class to aid in decreasing trauma response to guns stemming from mother's suicide.      Treatment Objective(s) Addressed in This Session:   Client will Identify negative self-talk and behaviors: challenge core beliefs, myths, and actions.  Client will learn and practice Interpersonal Effectiveness techniques to more effectively respond to conflict within relationships.     Intervention:   CBT: Discussed Pros and Cons to taking a new job in [place], and [place] [employer] techniques that may assist. Identified past experiences of bullying in regards to hearing disability that influence how client interprets others words and actions. Discussed mindfulness techniques to acknowledge this and utilize the facts to limit reactiveness.        ASSESSMENT: Current Emotional / Mental Status (status of significant symptoms):   Risk status (Self / Other harm or suicidal ideation)   Client denies current fears or concerns for personal safety.   Client denies  current or recent suicidal ideation or behaviors.   Client denies current or recent homicidal ideation or behaviors.   Client denies current or recent self injurious behavior or ideation.   Client denies other safety concerns.   Client Client reports there has been no change in risk factors since their last session.     Client Client reports there has been no change in protective factors since their last session.     A safety and risk management plan has been developed including: Client consented to co-developed safety plan.  [facility]'s safety and risk management plan was completed.  Client agreed to use safety plan should any safety concerns arise.  A copy was given to the patient.  Collaborative care team was informed of client's risk status and plan.     Appearance:   Appropriate    Eye Contact:   Good     Psychomotor Behavior: Normal    Attitude:   Cooperative    Orientation:   All   Speech    Rate / Production: Normal     Volume:  Loud    Mood:    Normal   Affect:    Appropriate    Thought Content:  Clear    Thought Form:  Coherent  Logical    Insight:    Fair      Medication Review:   No changes to current psychiatric medication(s)     Medication Compliance:   Yes     Changes in Health Issues:   None reported     Chemical Use Review:   Substance Use: Chemical use reviewed, no active concerns identified      Tobacco Use: No current tobacco use.       Collateral Reports Completed:   Not Applicable    PLAN: (Client Tasks / Therapist Tasks / Other)  Follow-up session scheduled. Plan to complete rapport check-in next session.        Saumya Javier MA, [facility]                                                          ________________________________________________________________________    Treatment Plan    Client's Name: Hannah Moreno  YOB: 1960    Date: 10/12/18    Diagnoses:            296.33 (F33.2) Major Depressive Disorder, Recurrent Episode, Severe With anxious distress and With seasonal  pattern  Psychosocial & Contextual Factors: Work stressors, Family stressors, Recent move, Past trauma  WHODAS: 18    Referral / Collaboration:  Referral to another professional/service is not indicated at this time.    Anticipated number of session or this episode of care: 30      MeasurableTreatment Goal(s) related to diagnosis / functional impairment(s)  Goal 1: Client will maintain safety.    Objective #A (Client Action)    Client will identify any SI thoughts, respond by using Safety Plan, and report on safety each session.  Status: New - Date: 10/12/18     Intervention(s)  Therapist will assist with Safety Plan development and reinforce effective use of skills and supports.    Goal 2: Client will decrease depression symptoms as evidence by PHQ-9 scores.    Objective #A (Client Action)    Status: New - Date: 10/12/18     Client will Identify negative self-talk and behaviors: challenge core beliefs, myths, and actions.    Intervention(s)  Therapist will assign homework    teach emotional recognition/identification. Assist with identifying and tracking triggers and patterns. Utilize Emotions Cycle to challenge ineffective thought and behavior patterns that perpetuate depression symptoms.  on Distress Tolerance techniques to manage emotional dysregulation.    Objective #B  Client will engage in an intentional self-care activity daily.    Status: New - Date: 10/12/18     Intervention(s)  Therapist will assign homework    teach emotional regulation skills.      Objective #C  Client will learn and practice Interpersonal Effectiveness techniques to more effectively respond to conflict within relationships.  Status: New - Date: 10/12/18     Intervention(s)  Therapist will assign homework    role-play conflict management  teach Interpersonal Effectiveness skills, including effective assertiveness and validation skills..    Client has reviewed and agreed to the above plan.      Saumya Javier MA, [facility]  11/20/18

## 2018-11-27 ENCOUNTER — OFFICE VISIT (OUTPATIENT)
Dept: PSYCHOLOGY | Facility: CLINIC | Age: 58
End: 2018-11-27
Payer: COMMERCIAL

## 2018-11-27 DIAGNOSIS — F33.2 MAJOR DEPRESSIVE DISORDER, RECURRENT EPISODE, SEVERE WITH ANXIOUS DISTRESS (H): Primary | ICD-10-CM

## 2018-11-27 PROCEDURE — 90834 PSYTX W PT 45 MINUTES: CPT | Performed by: COUNSELOR

## 2018-11-27 NOTE — PROGRESS NOTES
Progress Note    Client Name: Hannah Moreno  Date: 11/27/18         Service Type: Individual      Session Start Time: 8:00 am Session End Time: 8:45 am      Session Length: 45 min     Session #: 10     Attendees: Client attended alone    Treatment Plan Last Reviewed: 10/12/18 (Review by 1/12/18)  PHQ-9 / KATHRYN-7 : ---     DATA      Progress Since Last Session (Related to Symptoms / Goals / Homework):   Symptoms: No change       Homework: Achieved / completed to satisfaction       Episode of Care Goals: Satisfactory progress - ACTION (Actively working towards change); Intervened by reinforcing change plan / affirming steps taken.      Current / Ongoing Stressors and Concerns:   Ongoing: Work stressors, conflict with family members   Current: Client reports continued work stress and interpersonal conflict with boss. Continued discussion on personalization tendencies that increase distress and defensiveness in the workplace. She describes a meeting with her boss which she reports overall went well, however had an instance of her slamming her laptop and her boss crying.      Treatment Objective(s) Addressed in This Session:   Client will Identify negative self-talk and behaviors: challenge core beliefs, myths, and actions.  Client will learn and practice Interpersonal Effectiveness techniques to more effectively respond to conflict within relationships.     Intervention:   CBT: Identified past experiences of bullying in regards to hearing disability that influence how client interprets others words and actions. Discussed mindfulness techniques to acknowledge this and utilize the facts to limit reactiveness. Reflected tendancies toward aggressive communication, and utilized open ended questioning to elicit awareness of how aggressive communication may effect working relationships.        ASSESSMENT: Current Emotional / Mental Status (status of significant symptoms):   Risk  status (Self / Other harm or suicidal ideation)   Client denies current fears or concerns for personal safety.   Client denies current or recent suicidal ideation or behaviors.   Client denies current or recent homicidal ideation or behaviors.   Client denies current or recent self injurious behavior or ideation.   Client denies other safety concerns.   Client Client reports there has been no change in risk factors since their last session.     Client Client reports there has been no change in protective factors since their last session.     A safety and risk management plan has been developed including: Client consented to co-developed safety plan.  [facility]'s safety and risk management plan was completed.  Client agreed to use safety plan should any safety concerns arise.  A copy was given to the patient.  Collaborative care team was informed of client's risk status and plan.     Appearance:   Appropriate    Eye Contact:   Good     Psychomotor Behavior: Normal    Attitude:   Cooperative    Orientation:   All   Speech    Rate / Production: Normal     Volume:  Loud    Mood:    Normal   Affect:    Appropriate    Thought Content:  Clear    Thought Form:  Coherent  Logical    Insight:    Fair      Medication Review:   No changes to current psychiatric medication(s)     Medication Compliance:   Yes     Changes in Health Issues:   None reported     Chemical Use Review:   Substance Use: Chemical use reviewed, no active concerns identified      Tobacco Use: No current tobacco use.       Collateral Reports Completed:   Not Applicable    PLAN: (Client Tasks / Therapist Tasks / Other)  Follow-up session scheduled. Plan to complete rapport check-in next session.        Saumya Javier MA, [facility]                                                          ________________________________________________________________________    Treatment Plan    Client's Name: Hannah Moreno  YOB: 1960    Date: 10/12/18    Diagnoses:             296.33 (F33.2) Major Depressive Disorder, Recurrent Episode, Severe With anxious distress and With seasonal pattern  Psychosocial & Contextual Factors: Work stressors, Family stressors, Recent move, Past trauma  WHODAS: 18    Referral / Collaboration:  Referral to another professional/service is not indicated at this time.    Anticipated number of session or this episode of care: 30      MeasurableTreatment Goal(s) related to diagnosis / functional impairment(s)  Goal 1: Client will maintain safety.    Objective #A (Client Action)    Client will identify any SI thoughts, respond by using Safety Plan, and report on safety each session.  Status: New - Date: 10/12/18     Intervention(s)  Therapist will assist with Safety Plan development and reinforce effective use of skills and supports.    Goal 2: Client will decrease depression symptoms as evidence by PHQ-9 scores.    Objective #A (Client Action)    Status: New - Date: 10/12/18     Client will Identify negative self-talk and behaviors: challenge core beliefs, myths, and actions.    Intervention(s)  Therapist will assign homework    teach emotional recognition/identification. Assist with identifying and tracking triggers and patterns. Utilize Emotions Cycle to challenge ineffective thought and behavior patterns that perpetuate depression symptoms.  on Distress Tolerance techniques to manage emotional dysregulation.    Objective #B  Client will engage in an intentional self-care activity daily.    Status: New - Date: 10/12/18     Intervention(s)  Therapist will assign homework    teach emotional regulation skills.      Objective #C  Client will learn and practice Interpersonal Effectiveness techniques to more effectively respond to conflict within relationships.  Status: New - Date: 10/12/18     Intervention(s)  Therapist will assign homework    role-play conflict management  teach Interpersonal Effectiveness skills, including effective assertiveness and  validation skills..    Client has reviewed and agreed to the above plan.      Saumya Javier MA, [facility]  11/27/18

## 2018-12-07 ENCOUNTER — OFFICE VISIT (OUTPATIENT)
Dept: PSYCHOLOGY | Facility: CLINIC | Age: 58
End: 2018-12-07
Payer: COMMERCIAL

## 2018-12-07 DIAGNOSIS — F33.2 MAJOR DEPRESSIVE DISORDER, RECURRENT EPISODE, SEVERE WITH ANXIOUS DISTRESS (H): Primary | ICD-10-CM

## 2018-12-07 PROCEDURE — 90834 PSYTX W PT 45 MINUTES: CPT | Performed by: COUNSELOR

## 2018-12-07 ASSESSMENT — ANXIETY QUESTIONNAIRES
IF YOU CHECKED OFF ANY PROBLEMS ON THIS QUESTIONNAIRE, HOW DIFFICULT HAVE THESE PROBLEMS MADE IT FOR YOU TO DO YOUR WORK, TAKE CARE OF THINGS AT HOME, OR GET ALONG WITH OTHER PEOPLE: NOT DIFFICULT AT ALL
6. BECOMING EASILY ANNOYED OR IRRITABLE: NOT AT ALL
2. NOT BEING ABLE TO STOP OR CONTROL WORRYING: NOT AT ALL
1. FEELING NERVOUS, ANXIOUS, OR ON EDGE: NOT AT ALL
GAD7 TOTAL SCORE: 0
3. WORRYING TOO MUCH ABOUT DIFFERENT THINGS: NOT AT ALL
7. FEELING AFRAID AS IF SOMETHING AWFUL MIGHT HAPPEN: NOT AT ALL
5. BEING SO RESTLESS THAT IT IS HARD TO SIT STILL: NOT AT ALL

## 2018-12-07 ASSESSMENT — PATIENT HEALTH QUESTIONNAIRE - PHQ9
5. POOR APPETITE OR OVEREATING: NOT AT ALL
SUM OF ALL RESPONSES TO PHQ QUESTIONS 1-9: 0

## 2018-12-07 NOTE — MR AVS SNAPSHOT
MRN:[id number]                      After Visit Summary   12/7/2018    Hannah Moreno    MRN: [id number]           Visit Information        Provider Department      12/7/2018 7:30 AM [facility]      Your next 10 appointments already scheduled     Dec 14, 2018  7:30 AM CST   Return Visit with Saumya Javier [facility] ([address] [id number]   [phone]            Dec 31, 2018 10:00 AM CST   SHORT with KAIT Castano [facility] ([facility])    [address] [place] [id number] [phone]            Dec 31, 2018  3:00 PM CST   [facility]    [address] [place] [id number] [phone]            Jan 11, 2019  7:30 AM CST   [facility]    [address] [place] [id number] [phone]              MyChart Information     [employer] gives you secure access to your electronic health record. If you see a primary care provider, you can also send messages to your care team and make appointments. If you have questions, please call your primary care clinic.  If you do not have a primary care provider, please call [phone] and they will assist you.        Care EveryWhere ID     This is your Care EveryWhere ID. This could be used by other organizations to access your [place] medical records  [id number]        Equal Access to Services     RAIN OHARA : Hadii aad chapo ramírez Sogiacomo, waaxda luqadaha, qaybta kaalmada adeegyada, cheko redding. So [facility] [phone].    ATENCIÓN: Si habla español, tiene a doll disposición servicios gratuitos de asistencia lingüística. Llame al  [phone].    We comply with applicable federal civil rights laws and [place] laws. We do not discriminate on the basis of race, color, national origin, age, disability, sex, sexual orientation, or gender identity.

## 2018-12-07 NOTE — PROGRESS NOTES
Progress Note    Client Name: Hannah Moreno  Date: 12/7/18         Service Type: Individual      Session Start Time: 7:30 am Session End Time: 8:15 am      Session Length: 45 min     Session #: 11     Attendees: Client attended alone    Treatment Plan Last Reviewed: 10/12/18 (Review by 1/12/18)  PHQ-9 / KATHRYN-7 : 0 / 0     DATA      Progress Since Last Session (Related to Symptoms / Goals / Homework):   Symptoms: Improving - Client reports no symptoms     Homework: Achieved / completed to satisfaction       Episode of Care Goals: Satisfactory progress - ACTION (Actively working towards change); Intervened by reinforcing change plan / affirming steps taken.      Current / Ongoing Stressors and Concerns:   Ongoing: Work stressors, conflict with family members   Current: Client reports no mental health distress due to being offered the job she applied for in [place]. She reports thinking about the pros and cons, and feels as if she will take the position. Discussed details of the move, and how she plans to address the logistics of moving states.     Treatment Objective(s) Addressed in This Session:   Client will Identify negative self-talk and behaviors: challenge core beliefs, myths, and actions.  Client will learn and practice Interpersonal Effectiveness techniques to more effectively respond to conflict within relationships.     Intervention:   CBT: Writer reflected possible difficulties and stressors that may arise with moving states, and prompted client to discuss resources and skills that may assist.   Motivational Interviewing: Utilized reflection and summarizing techinques to increase insight levels and reflect patterns.         ASSESSMENT: Current Emotional / Mental Status (status of significant symptoms):   Risk status (Self / Other harm or suicidal ideation)   Client denies current fears or concerns for personal safety.   Client denies current or recent suicidal  ideation or behaviors.   Client denies current or recent homicidal ideation or behaviors.   Client denies current or recent self injurious behavior or ideation.   Client denies other safety concerns.   Client Client reports there has been no change in risk factors since their last session.     Client Client reports there has been no change in protective factors since their last session.     A safety and risk management plan has been developed including: Client consented to co-developed safety plan.  [facility]'s safety and risk management plan was completed.  Client agreed to use safety plan should any safety concerns arise.  A copy was given to the patient.  Collaborative care team was informed of client's risk status and plan.     Appearance:   Appropriate    Eye Contact:   Good     Psychomotor Behavior: Normal    Attitude:   Cooperative    Orientation:   All   Speech    Rate / Production: Normal     Volume:  Loud    Mood:    Normal   Affect:    Appropriate    Thought Content:  Clear    Thought Form:  Coherent  Logical    Insight:    Fair      Medication Review:   No changes to current psychiatric medication(s)     Medication Compliance:   Yes     Changes in Health Issues:   None reported     Chemical Use Review:   Substance Use: Chemical use reviewed, no active concerns identified      Tobacco Use: No current tobacco use.       Collateral Reports Completed:   Not Applicable    PLAN: (Client Tasks / Therapist Tasks / Other)  Client is assigned to identify concerns or barriers to mental health maintenance in upcoming mood in order to develop a [place] Ahead plan in upcoming sessions.        Saumya Javier MA, [facility]                                                          ________________________________________________________________________    Treatment Plan    Client's Name: Hannah Moreno  YOB: 1960    Date: 10/12/18    Diagnoses:            296.33 (F33.2) Major Depressive Disorder, Recurrent Episode,  Severe With anxious distress and With seasonal pattern  Psychosocial & Contextual Factors: Work stressors, Family stressors, Recent move, Past trauma  WHODAS: 18    Referral / Collaboration:  Referral to another professional/service is not indicated at this time.    Anticipated number of session or this episode of care: 30      MeasurableTreatment Goal(s) related to diagnosis / functional impairment(s)  Goal 1: Client will maintain safety.    Objective #A (Client Action)    Client will identify any SI thoughts, respond by using Safety Plan, and report on safety each session.  Status: New - Date: 10/12/18     Intervention(s)  Therapist will assist with Safety Plan development and reinforce effective use of skills and supports.    Goal 2: Client will decrease depression symptoms as evidence by PHQ-9 scores.    Objective #A (Client Action)    Status: New - Date: 10/12/18     Client will Identify negative self-talk and behaviors: challenge core beliefs, myths, and actions.    Intervention(s)  Therapist will assign homework    teach emotional recognition/identification. Assist with identifying and tracking triggers and patterns. Utilize Emotions Cycle to challenge ineffective thought and behavior patterns that perpetuate depression symptoms.  on Distress Tolerance techniques to manage emotional dysregulation.    Objective #B  Client will engage in an intentional self-care activity daily.    Status: New - Date: 10/12/18     Intervention(s)  Therapist will assign homework    teach emotional regulation skills.      Objective #C  Client will learn and practice Interpersonal Effectiveness techniques to more effectively respond to conflict within relationships.  Status: New - Date: 10/12/18     Intervention(s)  Therapist will assign homework    role-play conflict management  teach Interpersonal Effectiveness skills, including effective assertiveness and validation skills..    Client has reviewed and agreed to the above  plan.      Saumya Javier MA, [facility]  12/7/18

## 2018-12-08 ASSESSMENT — ANXIETY QUESTIONNAIRES: GAD7 TOTAL SCORE: 0

## 2018-12-14 ENCOUNTER — OFFICE VISIT (OUTPATIENT)
Dept: PSYCHOLOGY | Facility: CLINIC | Age: 58
End: 2018-12-14
Payer: COMMERCIAL

## 2018-12-14 DIAGNOSIS — F33.2 MAJOR DEPRESSIVE DISORDER, RECURRENT EPISODE, SEVERE WITH ANXIOUS DISTRESS (H): Primary | ICD-10-CM

## 2018-12-14 PROCEDURE — 90834 PSYTX W PT 45 MINUTES: CPT | Performed by: COUNSELOR

## 2018-12-14 NOTE — PROGRESS NOTES
Progress Note    Client Name: Hannah Moreno  Date: 12/14/18         Service Type: Individual      Session Start Time: 7:30 am Session End Time: 8:10 am      Session Length: 40 min     Session #: 12     Attendees: Client attended alone    Treatment Plan Last Reviewed: 10/12/18 (Review by 1/12/18)  PHQ-9 / KATHRYN-7 : ---     DATA      Progress Since Last Session (Related to Symptoms / Goals / Homework):   Symptoms: Improving - Client reports no symptoms     Homework: Achieved / completed to satisfaction       Episode of Care Goals: Satisfactory progress - ACTION (Actively working towards change); Intervened by reinforcing change plan / affirming steps taken.      Current / Ongoing Stressors and Concerns:   Ongoing: Work stressors, conflict with family members   Current: Client reports no mental health distress due to feeling excited and hopeful about moving to [place] and taking a new job. Discussed details of the move, and how she plans to address the logistics of moving states. Identified supports she may utilize to assist with the move and transition.     Treatment Objective(s) Addressed in This Session:   Client will engage in an intentional self-care activity daily.  Client will learn and practice Interpersonal Effectiveness techniques to more effectively respond to conflict within relationships.     Intervention:   CBT: Prompted discussed of self-care and supports that may be utilized following her move.  Motivational Interviewing: Utilized reflection and summarizing techinques to increase insight levels and reflect patterns.         ASSESSMENT: Current Emotional / Mental Status (status of significant symptoms):   Risk status (Self / Other harm or suicidal ideation)   Client denies current fears or concerns for personal safety.   Client denies current or recent suicidal ideation or behaviors.   Client denies current or recent homicidal ideation or behaviors.   Client  denies current or recent self injurious behavior or ideation.   Client denies other safety concerns.   Client Client reports there has been no change in risk factors since their last session.     Client Client reports there has been no change in protective factors since their last session.     A safety and risk management plan has been developed including: Client consented to co-developed safety plan.  [facility]'s safety and risk management plan was completed.  Client agreed to use safety plan should any safety concerns arise.  A copy was given to the patient.  Collaborative care team was informed of client's risk status and plan.     Appearance:   Appropriate    Eye Contact:   Good     Psychomotor Behavior: Normal    Attitude:   Cooperative    Orientation:   All   Speech    Rate / Production: Normal     Volume:  Loud    Mood:    Normal   Affect:    Appropriate    Thought Content:  Clear    Thought Form:  Coherent  Logical    Insight:    Fair      Medication Review:   No changes to current psychiatric medication(s)     Medication Compliance:   Yes     Changes in Health Issues:   None reported     Chemical Use Review:   Substance Use: Chemical use reviewed, no active concerns identified      Tobacco Use: No current tobacco use.       Collateral Reports Completed:   Not Applicable    PLAN: (Client Tasks / Therapist Tasks / Other)  Client is assigned to identify concerns or barriers to mental health maintenance in upcoming mood in order to develop a [place] Ahead plan in upcoming sessions.        Saumya Javier MA, [facility]                                                          ________________________________________________________________________    Treatment Plan    Client's Name: Hannah Moreno  YOB: 1960    Date: 10/12/18    Diagnoses:            296.33 (F33.2) Major Depressive Disorder, Recurrent Episode, Severe With anxious distress and With seasonal pattern  Psychosocial & Contextual Factors: Work  stressors, Family stressors, Recent move, Past trauma  WHODAS: 18    Referral / Collaboration:  Referral to another professional/service is not indicated at this time.    Anticipated number of session or this episode of care: 30      MeasurableTreatment Goal(s) related to diagnosis / functional impairment(s)  Goal 1: Client will maintain safety.    Objective #A (Client Action)    Client will identify any SI thoughts, respond by using Safety Plan, and report on safety each session.  Status: New - Date: 10/12/18     Intervention(s)  Therapist will assist with Safety Plan development and reinforce effective use of skills and supports.    Goal 2: Client will decrease depression symptoms as evidence by PHQ-9 scores.    Objective #A (Client Action)    Status: New - Date: 10/12/18     Client will Identify negative self-talk and behaviors: challenge core beliefs, myths, and actions.    Intervention(s)  Therapist will assign homework    teach emotional recognition/identification. Assist with identifying and tracking triggers and patterns. Utilize Emotions Cycle to challenge ineffective thought and behavior patterns that perpetuate depression symptoms.  on Distress Tolerance techniques to manage emotional dysregulation.    Objective #B  Client will engage in an intentional self-care activity daily.    Status: New - Date: 10/12/18     Intervention(s)  Therapist will assign homework    teach emotional regulation skills.      Objective #C  Client will learn and practice Interpersonal Effectiveness techniques to more effectively respond to conflict within relationships.  Status: New - Date: 10/12/18     Intervention(s)  Therapist will assign homework    role-play conflict management  teach Interpersonal Effectiveness skills, including effective assertiveness and validation skills..    Client has reviewed and agreed to the above plan.      Saumya Javier MA, [facility]  12/14/18

## 2018-12-28 ENCOUNTER — MYC REFILL (OUTPATIENT)
Dept: OTHER | Age: 58
End: 2018-12-28

## 2018-12-28 DIAGNOSIS — F32.2 SEVERE SINGLE CURRENT EPISODE OF MAJOR DEPRESSIVE DISORDER, WITHOUT PSYCHOTIC FEATURES (H): ICD-10-CM

## 2018-12-28 RX ORDER — ESCITALOPRAM OXALATE 20 MG/1
20 TABLET ORAL DAILY
Qty: 90 TABLET | Refills: 0 | Status: SHIPPED | OUTPATIENT
Start: 2018-12-28 | End: 2019-01-04

## 2018-12-31 ENCOUNTER — OFFICE VISIT (OUTPATIENT)
Dept: PSYCHOLOGY | Facility: CLINIC | Age: 58
End: 2018-12-31
Payer: COMMERCIAL

## 2018-12-31 DIAGNOSIS — F33.2 MAJOR DEPRESSIVE DISORDER, RECURRENT EPISODE, SEVERE WITH ANXIOUS DISTRESS (H): Primary | ICD-10-CM

## 2018-12-31 PROCEDURE — 90834 PSYTX W PT 45 MINUTES: CPT | Performed by: COUNSELOR

## 2018-12-31 ASSESSMENT — ANXIETY QUESTIONNAIRES
5. BEING SO RESTLESS THAT IT IS HARD TO SIT STILL: NOT AT ALL
2. NOT BEING ABLE TO STOP OR CONTROL WORRYING: NOT AT ALL
GAD7 TOTAL SCORE: 0
6. BECOMING EASILY ANNOYED OR IRRITABLE: NOT AT ALL
7. FEELING AFRAID AS IF SOMETHING AWFUL MIGHT HAPPEN: NOT AT ALL
3. WORRYING TOO MUCH ABOUT DIFFERENT THINGS: NOT AT ALL
IF YOU CHECKED OFF ANY PROBLEMS ON THIS QUESTIONNAIRE, HOW DIFFICULT HAVE THESE PROBLEMS MADE IT FOR YOU TO DO YOUR WORK, TAKE CARE OF THINGS AT HOME, OR GET ALONG WITH OTHER PEOPLE: NOT DIFFICULT AT ALL
1. FEELING NERVOUS, ANXIOUS, OR ON EDGE: NOT AT ALL

## 2018-12-31 ASSESSMENT — PATIENT HEALTH QUESTIONNAIRE - PHQ9
SUM OF ALL RESPONSES TO PHQ QUESTIONS 1-9: 0
5. POOR APPETITE OR OVEREATING: NOT AT ALL

## 2018-12-31 NOTE — PROGRESS NOTES
Progress Note    Client Name: Hannah Moreno  Date: 12/31/18         Service Type: Individual      Session Start Time: 3:00 pm Session End Time: 3:45 pm      Session Length: 45 min     Session #: 13     Attendees: Client attended alone    Treatment Plan Last Reviewed: 10/12/18 (Review by 1/12/18)  PHQ-9 / KATHRYN-7 :      DATA      Progress Since Last Session (Related to Symptoms / Goals / Homework):   Symptoms: Improving - Client reports no symptoms     Homework: Achieved / completed to satisfaction       Episode of Care Goals: Satisfactory progress - ACTION (Actively working towards change); Intervened by reinforcing change plan / affirming steps taken.      Current / Ongoing Stressors and Concerns:   Ongoing: Work stressors, conflict with family members   Current: Client reports continued lack of mental health distress due to feeling excited and hopeful about moving to [place] and taking a new job. Discussed details of the move.  Identified supports and skills she may utilize to assist with the move and transition.     Treatment Objective(s) Addressed in This Session:   Client will engage in an intentional self-care activity daily.  Client will learn and practice Interpersonal Effectiveness techniques to more effectively respond to conflict within relationships.     Intervention:   CBT: Prompted discussed of self-care and supports that may be utilized following her move. Discussed patterns of depression symptoms and warning signs to be aware of during and following the move. Discussed safety measures for her travel.  Motivational Interviewing: Utilized reflection and summarizing techinques to increase insight levels and reflect patterns.         ASSESSMENT: Current Emotional / Mental Status (status of significant symptoms):   Risk status (Self / Other harm or suicidal ideation)   Client denies current fears or concerns for personal safety.   Client denies current or  recent suicidal ideation or behaviors.   Client denies current or recent homicidal ideation or behaviors.   Client denies current or recent self injurious behavior or ideation.   Client denies other safety concerns.   Client Client reports there has been no change in risk factors since their last session.     Client Client reports there has been no change in protective factors since their last session.     A safety and risk management plan has been developed including: Client consented to co-developed safety plan.  [facility]'s safety and risk management plan was completed.  Client agreed to use safety plan should any safety concerns arise.  A copy was given to the patient.  Collaborative care team was informed of client's risk status and plan.     Appearance:   Appropriate    Eye Contact:   Good     Psychomotor Behavior: Normal    Attitude:   Cooperative    Orientation:   All   Speech    Rate / Production: Normal     Volume:  Loud    Mood:    Normal   Affect:    Appropriate    Thought Content:  Clear    Thought Form:  Coherent  Logical    Insight:    Fair      Medication Review:   No changes to current psychiatric medication(s)     Medication Compliance:   Yes     Changes in Health Issues:   None reported     Chemical Use Review:   Substance Use: Chemical use reviewed, no active concerns identified      Tobacco Use: No current tobacco use.       Collateral Reports Completed:   Not Applicable    PLAN: (Client Tasks / Therapist Tasks / Other)  Wrap-up session scheduled for next week.        Saumya Javier MA, [facility]                                                          ________________________________________________________________________    Treatment Plan    Client's Name: Hannah Moreno  YOB: 1960    Date: 10/12/18    Diagnoses:            296.33 (F33.2) Major Depressive Disorder, Recurrent Episode, Severe With anxious distress and With seasonal pattern  Psychosocial & Contextual Factors: Work stressors,  Family stressors, Recent move, Past trauma  WHODAS: 18    Referral / Collaboration:  Referral to another professional/service is not indicated at this time.    Anticipated number of session or this episode of care: 30      MeasurableTreatment Goal(s) related to diagnosis / functional impairment(s)  Goal 1: Client will maintain safety.    Objective #A (Client Action)    Client will identify any SI thoughts, respond by using Safety Plan, and report on safety each session.  Status: New - Date: 10/12/18     Intervention(s)  Therapist will assist with Safety Plan development and reinforce effective use of skills and supports.    Goal 2: Client will decrease depression symptoms as evidence by PHQ-9 scores.    Objective #A (Client Action)    Status: New - Date: 10/12/18     Client will Identify negative self-talk and behaviors: challenge core beliefs, myths, and actions.    Intervention(s)  Therapist will assign homework    teach emotional recognition/identification. Assist with identifying and tracking triggers and patterns. Utilize Emotions Cycle to challenge ineffective thought and behavior patterns that perpetuate depression symptoms.  on Distress Tolerance techniques to manage emotional dysregulation.    Objective #B  Client will engage in an intentional self-care activity daily.    Status: New - Date: 10/12/18     Intervention(s)  Therapist will assign homework    teach emotional regulation skills.      Objective #C  Client will learn and practice Interpersonal Effectiveness techniques to more effectively respond to conflict within relationships.  Status: New - Date: 10/12/18     Intervention(s)  Therapist will assign homework    role-play conflict management  teach Interpersonal Effectiveness skills, including effective assertiveness and validation skills..    Client has reviewed and agreed to the above plan.      Saumya Javier MA, [facility]  12/31/18

## 2019-01-01 ASSESSMENT — ANXIETY QUESTIONNAIRES: GAD7 TOTAL SCORE: 0

## 2019-01-04 ENCOUNTER — OFFICE VISIT (OUTPATIENT)
Dept: FAMILY MEDICINE | Facility: CLINIC | Age: 59
End: 2019-01-04
Payer: COMMERCIAL

## 2019-01-04 VITALS
SYSTOLIC BLOOD PRESSURE: 126 MMHG | HEART RATE: 64 BPM | TEMPERATURE: 98.3 F | RESPIRATION RATE: 20 BRPM | DIASTOLIC BLOOD PRESSURE: 74 MMHG | HEIGHT: 67 IN | BODY MASS INDEX: 36.6 KG/M2 | WEIGHT: 233.2 LBS

## 2019-01-04 DIAGNOSIS — F32.2 SEVERE SINGLE CURRENT EPISODE OF MAJOR DEPRESSIVE DISORDER, WITHOUT PSYCHOTIC FEATURES (H): ICD-10-CM

## 2019-01-04 DIAGNOSIS — I10 ESSENTIAL HYPERTENSION, BENIGN: ICD-10-CM

## 2019-01-04 DIAGNOSIS — R00.0 TACHYCARDIA: ICD-10-CM

## 2019-01-04 PROCEDURE — 99213 OFFICE O/P EST LOW 20 MIN: CPT | Performed by: NURSE PRACTITIONER

## 2019-01-04 RX ORDER — ATENOLOL 25 MG/1
TABLET ORAL
Qty: 90 TABLET | Refills: 0 | Status: SHIPPED | OUTPATIENT
Start: 2019-01-04

## 2019-01-04 RX ORDER — ESCITALOPRAM OXALATE 20 MG/1
20 TABLET ORAL DAILY
Qty: 90 TABLET | Refills: 0 | Status: SHIPPED | OUTPATIENT
Start: 2019-01-04

## 2019-01-04 ASSESSMENT — ENCOUNTER SYMPTOMS
LIGHT-HEADEDNESS: 0
PALPITATIONS: 0
NAUSEA: 0
CHEST TIGHTNESS: 0
WHEEZING: 0
ABDOMINAL PAIN: 0
HEADACHES: 0
FATIGUE: 0
CONSTIPATION: 0
NERVOUS/ANXIOUS: 0
ARTHRALGIAS: 0
SHORTNESS OF BREATH: 0
DYSPHORIC MOOD: 0
SORE THROAT: 0
DIARRHEA: 0
DIZZINESS: 0
RHINORRHEA: 0
VOMITING: 0
ABDOMINAL DISTENTION: 0
NUMBNESS: 0
MYALGIAS: 0
COUGH: 0
SLEEP DISTURBANCE: 0

## 2019-01-04 ASSESSMENT — PATIENT HEALTH QUESTIONNAIRE - PHQ9
5. POOR APPETITE OR OVEREATING: NOT AT ALL
SUM OF ALL RESPONSES TO PHQ QUESTIONS 1-9: 0

## 2019-01-04 ASSESSMENT — ANXIETY QUESTIONNAIRES
1. FEELING NERVOUS, ANXIOUS, OR ON EDGE: NOT AT ALL
3. WORRYING TOO MUCH ABOUT DIFFERENT THINGS: NOT AT ALL
6. BECOMING EASILY ANNOYED OR IRRITABLE: NOT AT ALL
GAD7 TOTAL SCORE: 0
5. BEING SO RESTLESS THAT IT IS HARD TO SIT STILL: NOT AT ALL
7. FEELING AFRAID AS IF SOMETHING AWFUL MIGHT HAPPEN: NOT AT ALL
2. NOT BEING ABLE TO STOP OR CONTROL WORRYING: NOT AT ALL

## 2019-01-04 ASSESSMENT — MIFFLIN-ST. JEOR: SCORE: 1666.45

## 2019-01-04 ASSESSMENT — PAIN SCALES - GENERAL: PAINLEVEL: NO PAIN (0)

## 2019-01-04 NOTE — PROGRESS NOTES
SUBJECTIVE:   Hannah Moreno is a 58 year old female who presents to clinic today for the following health issues:    Depression Followup    Status since last visit: Improved     See PHQ-9 for current symptoms.  Other associated symptoms: None    Complicating factors:   Significant life event:  Yes-  New job and will be moving to [place] this month   Current substance abuse:  None  Anxiety or Panic symptoms:  No    PHQ 11/14/2018 12/7/2018 12/31/2018   PHQ-9 Total Score 3 0 0   Q9: Suicide Ideation Not at all Not at all Not at all     PHQ-9  English  PHQ-9   Any Language  Suicide Assessment Five-step Evaluation and Treatment (SAFE-T)    Amount of exercise or physical activity: None    Problems taking medications regularly: No    Medication side effects: none    Diet: regular (no restrictions)      Problem list and histories reviewed & adjusted, as indicated.  Additional history: as documented    Current Outpatient Medications   Medication Sig Dispense Refill     atenolol (TENORMIN) 25 MG tablet TAKE ONE-HALF TABLET BY MOUTH ONCE DAILY 90 tablet 0     escitalopram (LEXAPRO) 20 MG tablet Take 1 tablet (20 mg) by mouth daily 90 tablet 0     MULTIVITAMIN OR 2 tabs daily       order for DME Equipment being ordered: Please provide with SAD light 1 Device 0     Probiotic Product (PROBIOTIC DAILY PO) Take 1 capsule by mouth 2 times daily.       Allergies   Allergen Reactions     Amoxicillin Hives     Sulfa Drugs Hives       Reviewed and updated as needed this visit by clinical staff  Tobacco  Allergies  Meds  Med Hx  Surg Hx  Fam Hx  Soc Hx      Reviewed and updated as needed this visit by Provider        Here today for recheck. Is going to be moving to [place] for a job. Had been wanting to move to [place] for sometime.  Has friends in [place].  Knows that the son will be good for her depression.  Has been taking Lexapro and is tolerating it well.  No side effects from medication that is aware of.    Needs have refill of  "atenolol.  No side effects that is aware of.  Has not been having any fluttering.  Does not usually check blood pressures.    ROS:  Review of Systems   Constitutional: Negative for fatigue.   HENT: Negative for ear pain, rhinorrhea and sore throat.    Eyes: Negative for visual disturbance.   Respiratory: Negative for cough, chest tightness, shortness of breath and wheezing.    Cardiovascular: Negative for chest pain, palpitations and leg swelling.   Gastrointestinal: Negative for abdominal distention, abdominal pain, constipation, diarrhea, nausea and vomiting.   Endocrine: Negative for cold intolerance and heat intolerance.   Musculoskeletal: Negative for arthralgias and myalgias.   Skin: Negative for rash.   Neurological: Negative for dizziness, light-headedness, numbness and headaches.   Psychiatric/Behavioral: Negative for dysphoric mood and sleep disturbance. The patient is not nervous/anxious.          OBJECTIVE:     /74 (BP Location: Left arm, Patient Position: Sitting, Cuff Size: Adult Large)   Pulse 64   Temp 98.3  F (36.8  C) (Tympanic)   Resp 20   Ht 1.695 m (5' 6.75\")   Wt 105.8 kg (233 lb 3.2 oz)   BMI 36.80 kg/m     Body mass index is 36.8 kg/m .  Physical Exam   Constitutional: She appears well-developed.   HENT:   Head: Normocephalic.   Cardiovascular: Normal rate, regular rhythm and normal heart sounds.   Pulmonary/Chest: Effort normal and breath sounds normal.   Neurological: She is alert.   Skin: Skin is warm.       ASSESSMENT/PLAN:   1. Tachycardia  Doing well on current.    Plan to continue atenolol  - atenolol (TENORMIN) 25 MG tablet; TAKE ONE-HALF TABLET BY MOUTH ONCE DAILY  Dispense: 90 tablet; Refill: 0    2. BENIGN HYPERTENSION  Doing well on current.  Plan to continue atenolol.  - atenolol (TENORMIN) 25 MG tablet; TAKE ONE-HALF TABLET BY MOUTH ONCE DAILY  Dispense: 90 tablet; Refill: 0    3. Severe single current episode of major depressive disorder, without psychotic features " (H)  Doing well on current.  Encouraged to continue Lexapro for at least the next 6 months.  Informed that will need to establish care with new provider for further refills.  - escitalopram (LEXAPRO) 20 MG tablet; Take 1 tablet (20 mg) by mouth daily  Dispense: 90 tablet; Refill: 0      KAIT Rain [facility]

## 2019-01-05 ASSESSMENT — ANXIETY QUESTIONNAIRES: GAD7 TOTAL SCORE: 0

## 2019-01-11 ENCOUNTER — OFFICE VISIT (OUTPATIENT)
Dept: PSYCHOLOGY | Facility: CLINIC | Age: 59
End: 2019-01-11
Payer: COMMERCIAL

## 2019-01-11 DIAGNOSIS — F33.2 MAJOR DEPRESSIVE DISORDER, RECURRENT EPISODE, SEVERE WITH ANXIOUS DISTRESS (H): Primary | ICD-10-CM

## 2019-01-11 PROCEDURE — 90834 PSYTX W PT 45 MINUTES: CPT | Performed by: COUNSELOR

## 2019-01-11 ASSESSMENT — ANXIETY QUESTIONNAIRES
1. FEELING NERVOUS, ANXIOUS, OR ON EDGE: NOT AT ALL
5. BEING SO RESTLESS THAT IT IS HARD TO SIT STILL: NOT AT ALL
7. FEELING AFRAID AS IF SOMETHING AWFUL MIGHT HAPPEN: NOT AT ALL
3. WORRYING TOO MUCH ABOUT DIFFERENT THINGS: NOT AT ALL
2. NOT BEING ABLE TO STOP OR CONTROL WORRYING: NOT AT ALL
6. BECOMING EASILY ANNOYED OR IRRITABLE: NOT AT ALL
GAD7 TOTAL SCORE: 0
IF YOU CHECKED OFF ANY PROBLEMS ON THIS QUESTIONNAIRE, HOW DIFFICULT HAVE THESE PROBLEMS MADE IT FOR YOU TO DO YOUR WORK, TAKE CARE OF THINGS AT HOME, OR GET ALONG WITH OTHER PEOPLE: NOT DIFFICULT AT ALL

## 2019-01-11 ASSESSMENT — PATIENT HEALTH QUESTIONNAIRE - PHQ9
SUM OF ALL RESPONSES TO PHQ QUESTIONS 1-9: 0
5. POOR APPETITE OR OVEREATING: NOT AT ALL

## 2019-01-11 NOTE — PROGRESS NOTES
Discharge Summary  Multiple Sessions    Client Name: Hannah Moreno MRN#: [id number] YOB: 1960    Discharge Date:   January 11, 2019      Service Type: Individual      Session Start Time: 7:30 am  Session End Time: 8:15      Session Length: 45 - 50     Session #: 19     Attendees: Client attended alone    Focus of Treatment Objective(s):  Client's presenting concerns included: Depressed Mood -    Risk Management / Safety Concerns related to: Suicidal ideation  Stage of Change at time of Discharge: MAINTENANCE (Working to maintain change, with risk of relapse)    Medication Adherence:  Yes    Chemical Use:  No    Assessment: Current Emotional / Mental Status (status of significant symptoms):    Risk status (Self / Other harm or suicidal ideation)  Client denies current fears or concerns for personal safety.  Client denies current or recent suicidal ideation or behaviors.  Client denies current or recent homicidal ideation or behaviors.  Client denies current or recent self injurious behavior or ideation.  Client denies other safety concerns.  A safety and risk management plan has been developed including - See Epic Charting.    Appearance:   Appropriate   Eye Contact:   Good   Psychomotor Behavior: Normal   Attitude:   Cooperative   Orientation:   All  Speech   Rate / Production: Normal    Volume:  Normal   Mood:    Normal  Affect:    Appropriate   Thought Content:  Clear   Thought Form:  Coherent  Logical   Insight:   Good     DSM5 Diagnoses: (Sustained by DSM5 Criteria Listed Above)  Diagnoses: 296.33 (F33.2) Major Depressive Disorder, Recurrent Episode, Severe With anxious distress and With seasonal pattern  Psychosocial & Contextual Factors: Work stressors, Family stressors, Recent move, Past trauma  WHODAS 2.0 (12 item) Score: 18    Reason for Discharge:  Client is satisfied with progress and client is moving to [place] for a new job      Aftercare Plan:  Client agreed to follow  safety contract after discharge  Client may resume counseling services at any time in the future by calling the State mental health facility Intake Office, [phone].  Client will participate in EAP counseling services with new job if experiencing relapse with symptoms  Provided behavioral relapse prevention plan, which included identifying early warning signs, self-care measures, and supports to utilize during and after move.      Saumya Javier MA, [facility]

## 2019-01-11 NOTE — Clinical Note
This episode of care all wrapped up with Hannah as she transitions to [place]. Symptoms appear stable, and resources were discussed if symptoms relapse. She talked about how much she appreciated the work you have done with her :)

## 2019-01-12 ASSESSMENT — ANXIETY QUESTIONNAIRES: GAD7 TOTAL SCORE: 0

## 2019-06-02 ENCOUNTER — MYC MEDICAL ADVICE (OUTPATIENT)
Dept: FAMILY MEDICINE | Facility: CLINIC | Age: 59
End: 2019-06-02

## 2021-04-30 ENCOUNTER — TRANSFERRED RECORDS (OUTPATIENT)
Dept: HEALTH INFORMATION MANAGEMENT | Facility: CLINIC | Age: 61
End: 2021-04-30

## 2022-06-23 ENCOUNTER — TRANSFERRED RECORDS (OUTPATIENT)
Dept: HEALTH INFORMATION MANAGEMENT | Facility: CLINIC | Age: 62
End: 2022-06-23

## 2022-06-23 LAB — EJECTION FRACTION: 65 %

## 2023-05-17 ENCOUNTER — TRANSFERRED RECORDS (OUTPATIENT)
Dept: HEALTH INFORMATION MANAGEMENT | Facility: CLINIC | Age: 63
End: 2023-05-17

## 2023-05-25 ENCOUNTER — TRANSFERRED RECORDS (OUTPATIENT)
Dept: HEALTH INFORMATION MANAGEMENT | Facility: CLINIC | Age: 63
End: 2023-05-25

## 2023-05-26 ENCOUNTER — TRANSFERRED RECORDS (OUTPATIENT)
Dept: HEALTH INFORMATION MANAGEMENT | Facility: CLINIC | Age: 63
End: 2023-05-26

## 2023-05-26 LAB — EJECTION FRACTION: NORMAL %

## 2023-06-06 ENCOUNTER — TRANSFERRED RECORDS (OUTPATIENT)
Dept: HEALTH INFORMATION MANAGEMENT | Facility: CLINIC | Age: 63
End: 2023-06-06

## 2023-10-18 ENCOUNTER — TRANSFERRED RECORDS (OUTPATIENT)
Dept: MULTI SPECIALTY CLINIC | Facility: CLINIC | Age: 63
End: 2023-10-18

## 2024-10-08 ENCOUNTER — OFFICE VISIT (OUTPATIENT)
Dept: INTERNAL MEDICINE | Facility: CLINIC | Age: 64
End: 2024-10-08
Payer: COMMERCIAL

## 2024-10-08 VITALS
BODY MASS INDEX: 33.73 KG/M2 | TEMPERATURE: 97.5 F | WEIGHT: 209.9 LBS | HEART RATE: 60 BPM | DIASTOLIC BLOOD PRESSURE: 76 MMHG | OXYGEN SATURATION: 98 % | SYSTOLIC BLOOD PRESSURE: 124 MMHG | HEIGHT: 66 IN | RESPIRATION RATE: 18 BRPM

## 2024-10-08 DIAGNOSIS — I10 ESSENTIAL HYPERTENSION, BENIGN: ICD-10-CM

## 2024-10-08 DIAGNOSIS — R25.3 EYE MUSCLE TWITCHES: Primary | ICD-10-CM

## 2024-10-08 LAB
ALBUMIN SERPL BCG-MCNC: 4.4 G/DL (ref 3.5–5.2)
ALP SERPL-CCNC: 80 U/L (ref 40–150)
ALT SERPL W P-5'-P-CCNC: 21 U/L (ref 0–50)
ANION GAP SERPL CALCULATED.3IONS-SCNC: 12 MMOL/L (ref 7–15)
AST SERPL W P-5'-P-CCNC: 27 U/L (ref 0–45)
BILIRUB SERPL-MCNC: 0.4 MG/DL
BUN SERPL-MCNC: 16 MG/DL (ref 8–23)
CALCIUM SERPL-MCNC: 9.4 MG/DL (ref 8.8–10.4)
CHLORIDE SERPL-SCNC: 102 MMOL/L (ref 98–107)
CK SERPL-CCNC: 67 U/L (ref 26–192)
CREAT SERPL-MCNC: 0.68 MG/DL (ref 0.51–0.95)
EGFRCR SERPLBLD CKD-EPI 2021: >90 ML/MIN/1.73M2
GLUCOSE SERPL-MCNC: 86 MG/DL (ref 70–99)
HCO3 SERPL-SCNC: 26 MMOL/L (ref 22–29)
MAGNESIUM SERPL-MCNC: 2.3 MG/DL (ref 1.7–2.3)
POTASSIUM SERPL-SCNC: 4.2 MMOL/L (ref 3.4–5.3)
PROT SERPL-MCNC: 7.4 G/DL (ref 6.4–8.3)
SODIUM SERPL-SCNC: 140 MMOL/L (ref 135–145)
TSH SERPL DL<=0.005 MIU/L-ACNC: 1.68 UIU/ML (ref 0.3–4.2)

## 2024-10-08 PROCEDURE — 99203 OFFICE O/P NEW LOW 30 MIN: CPT | Performed by: INTERNAL MEDICINE

## 2024-10-08 PROCEDURE — 83735 ASSAY OF MAGNESIUM: CPT | Performed by: INTERNAL MEDICINE

## 2024-10-08 PROCEDURE — 36415 COLL VENOUS BLD VENIPUNCTURE: CPT | Performed by: INTERNAL MEDICINE

## 2024-10-08 PROCEDURE — 80053 COMPREHEN METABOLIC PANEL: CPT | Performed by: INTERNAL MEDICINE

## 2024-10-08 PROCEDURE — 84443 ASSAY THYROID STIM HORMONE: CPT | Performed by: INTERNAL MEDICINE

## 2024-10-08 PROCEDURE — 82550 ASSAY OF CK (CPK): CPT | Performed by: INTERNAL MEDICINE

## 2024-10-08 RX ORDER — OLMESARTAN MEDOXOMIL 20 MG/1
TABLET ORAL
COMMUNITY
Start: 2020-09-08 | End: 2024-10-22 | Stop reason: DRUGHIGH

## 2024-10-08 RX ORDER — ASPIRIN 81 MG/1
81 TABLET ORAL DAILY
COMMUNITY
Start: 2020-09-08

## 2024-10-08 RX ORDER — ATENOLOL 25 MG/1
25 TABLET ORAL DAILY
Qty: 30 TABLET | Refills: 0 | Status: SHIPPED | OUTPATIENT
Start: 2024-10-08 | End: 2024-10-22

## 2024-10-08 ASSESSMENT — PATIENT HEALTH QUESTIONNAIRE - PHQ9
10. IF YOU CHECKED OFF ANY PROBLEMS, HOW DIFFICULT HAVE THESE PROBLEMS MADE IT FOR YOU TO DO YOUR WORK, TAKE CARE OF THINGS AT HOME, OR GET ALONG WITH OTHER PEOPLE: NOT DIFFICULT AT ALL
SUM OF ALL RESPONSES TO PHQ QUESTIONS 1-9: 1
SUM OF ALL RESPONSES TO PHQ QUESTIONS 1-9: 1

## 2024-10-08 ASSESSMENT — PAIN SCALES - GENERAL: PAINLEVEL: NO PAIN (0)

## 2024-10-08 NOTE — ASSESSMENT & PLAN NOTE
BP well controlled today. Did provide bridge of atenolol 25 mg daily until she can establish with cardiology later this month.

## 2024-10-08 NOTE — ASSESSMENT & PLAN NOTE
Patient presents to discuss L eye twitching over last year. Yesterday was constant throughout the day, prompting appt today. 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 Has seen optometry ~11/2023, they didn't find ocular cause. On exam, orbits appear normal and EOMI. I do see twitching of the L upper eyelid. No other muscle fasciculations seen.   - Will start by checking some basic labs and electrolytes  - Will place neurology referral for another opinion, to weigh in if further evaluation is needed  - Suspect this may be related to strain from computer work, she does try to take breaks and uses computer/blue light glasses

## 2024-10-08 NOTE — PROGRESS NOTES
Assessment & Plan   Problem List Items Addressed This Visit          Circulatory    Essential hypertension, benign     BP well controlled today. Did provide bridge of atenolol 25 mg daily until she can establish with cardiology later this month.          Relevant Medications    olmesartan (BENICAR) 20 MG tablet    atenolol (TENORMIN) 25 MG tablet       Musculoskeletal and Integumentary    Eye muscle twitches - Primary     Patient presents to discuss L eye twitching over last year. Yesterday was constant throughout the day, prompting appt today. 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 Has seen optometry ~11/2023, they didn't find ocular cause. On exam, orbits appear normal and EOMI. I do see twitching of the L upper eyelid. No other muscle fasciculations seen.   - Will start by checking some basic labs and electrolytes  - Will place neurology referral for another opinion, to weigh in if further evaluation is needed  - Suspect this may be related to strain from computer work, she does try to take breaks and uses computer/blue light glasses          Relevant Orders    TSH with free T4 reflex    Comprehensive metabolic panel    Magnesium    Adult Neurology  Referral    CK total        FUTURE APPOINTMENTS:       - Follow-up for annual visit or as needed      Subjective   Hannah is a 64 year old, presenting for the following health issues:  Eye Twitching  (Patient states left eye has been twitching. /Patient states eye twitching has been going on for a year. ) and Medication Refill (Atenolol )        10/8/2024     1:42 PM   Additional Questions   Roomed by Jorge Luis MUNOZ MA       Via the Health Maintenance questionnaire, the patient has reported the following services have been completed -Mammogram: Radiology limited-[facility] 2023-10-18, this information has been sent to the abstraction team.    History of Present Illness     Reason for visit:  Nerve twitch in left eye.  Symptom onset:  More than a  "month  Symptoms include:  Twitching of upper eyelid  Symptom intensity:  Severe  Symptom progression:  Worsening  Had these symptoms before:  Yes  Has tried/received treatment for these symptoms:  No  What makes it worse:  No  What makes it better:  No   She is taking medications regularly.     Just moved back from [place] in June.     Eye twitching going on for the last year. Did see an eye doctor about this ~11/2023, no issues. Only the L eye. Only one spot. Keeps her up at night. Intermittent but some days is continuous. Can stop if she holds it. No hx of stroke or [place] Palsy.     Noticed when she was on computer more for work.     Started weight loss program a year ago, lots of artifical sugars involved. Weaning off right now.     She started a magnesium supplement a few weeks ago. Over the counter. Hasn't made a difference.     HTN and MR: Will establish with cardiology October (scheduled with Dr. Frankel 10/22/24).     Review of Systems  Constitutional, neuro, ENT, endocrine, pulmonary, cardiac, gastrointestinal, genitourinary, musculoskeletal, integument and psychiatric systems are negative, except as otherwise noted.      Objective    /76 (BP Location: Right arm, Patient Position: Sitting, Cuff Size: Adult Regular)   Pulse 60   Temp 97.5  F (36.4  C) (Oral)   Resp 18   Ht 1.676 m (5' 6\")   Wt 95.2 kg (209 lb 14.4 oz)   SpO2 98%   BMI 33.88 kg/m    Body mass index is 33.88 kg/m .  Physical Exam   GENERAL: alert and no distress  EYES: Eyes grossly normal to inspection, PERRL and conjunctivae and sclerae normal. +twitching noted of L eyelid during exam  HENT: nose and mouth without ulcers or lesions  NECK: no adenopathy, no asymmetry, masses, or scars  RESP: breathing comfortably and speaking in full sentences on room air with no respiratory distress or coughing  CV: warm and well perfused  SKIN: no suspicious lesions or rashes on exposed skin  NEURO: No focal deficits, strength 5/5 in all " extremities, sensation light touch intact throughout, mentation intact and mildly dysarthric speech  PSYCH: mentation appears normal, affect normal/bright    No results found for this or any previous visit (from the past 24 hour(s)).        Signed Electronically by: Sylvia Diaz MD

## 2024-10-12 ENCOUNTER — HEALTH MAINTENANCE LETTER (OUTPATIENT)
Age: 64
End: 2024-10-12

## 2024-10-22 ENCOUNTER — OFFICE VISIT (OUTPATIENT)
Dept: CARDIOLOGY | Facility: CLINIC | Age: 64
End: 2024-10-22
Payer: COMMERCIAL

## 2024-10-22 VITALS
DIASTOLIC BLOOD PRESSURE: 66 MMHG | WEIGHT: 214 LBS | RESPIRATION RATE: 17 BRPM | HEART RATE: 68 BPM | SYSTOLIC BLOOD PRESSURE: 116 MMHG | HEIGHT: 66 IN | BODY MASS INDEX: 34.39 KG/M2

## 2024-10-22 DIAGNOSIS — I10 ESSENTIAL HYPERTENSION, BENIGN: ICD-10-CM

## 2024-10-22 DIAGNOSIS — I34.0 NONRHEUMATIC MITRAL VALVE REGURGITATION: Primary | ICD-10-CM

## 2024-10-22 PROCEDURE — G2211 COMPLEX E/M VISIT ADD ON: HCPCS | Performed by: INTERNAL MEDICINE

## 2024-10-22 PROCEDURE — 99204 OFFICE O/P NEW MOD 45 MIN: CPT | Performed by: INTERNAL MEDICINE

## 2024-10-22 RX ORDER — ATENOLOL 25 MG/1
25 TABLET ORAL DAILY
Qty: 90 TABLET | Refills: 3 | Status: SHIPPED | OUTPATIENT
Start: 2024-10-22

## 2024-10-22 RX ORDER — MAGNESIUM GLYCINATE 100 MG
200 CAPSULE ORAL DAILY
COMMUNITY

## 2024-10-22 RX ORDER — OLMESARTAN MEDOXOMIL 5 MG/1
5 TABLET ORAL DAILY
COMMUNITY
End: 2024-10-22

## 2024-10-22 RX ORDER — OLMESARTAN MEDOXOMIL 5 MG/1
5 TABLET ORAL DAILY
Qty: 90 TABLET | Refills: 3 | Status: SHIPPED | OUTPATIENT
Start: 2024-10-22

## 2024-10-22 NOTE — LETTER
"10/22/2024    Physician No Ref-Primary  No address on file    RE: Hannah Moreno       Dear Colleague,     I had the pleasure of seeing Hannah Moreno in the [facility].    HEART CARE ENCOUNTER CONSULTATON NOTE      [facility]  [phone]      Assessment/Recommendations   Assessment:  1.  Valvular heart disease: 3+ mitral regurgitation followed in [facility] recently moved and establishing care here.  2.  Hypertension: Well-controlled  3.  History of SVT status post ablation at [facility] in 2003  4.  Hearing loss   5.  Depression    Plan:  1.  Recommend repeat echocardiogram this year to reassess mitral valve disease  2.  Continue on atenolol, losartan for blood pressure control  3.  Recommend repeat echocardiogram 1 year follow-up at that time     History of Present Illness/Subjective    HPI: Hannah Moreno is a 64 year old female with history of SVT status post ablation in 2003, hypertension, hearing loss, depression, mitral regurgitation (graded 3+ moderate-severe) on last echocardiogram a year ago who I am seeing today to establish care.  She moved here in June from [facility] to be closer to her grandchildren.  She was first detected to have mitral regurgitation when she underwent a stress echocardiogram back in 2018 and has been stable since that time.  She does not exercise much but she does walk daily about 2030 minutes a day and has not noted any problems with breathing difficulty, chest pain or palpitations.  The longitudinal plan of care for the diagnosis(es)/condition(s) as documented were addressed during this visit. Due to the added complexity in care, I will continue to support Hannah in the subsequent management and with ongoing continuity of care.        Physical Examination  Review of Systems   Vitals: /66 (BP Location: Left arm, Patient Position: Sitting, Cuff Size: Adult Large)   Pulse 68   Resp 17   Ht 1.676 m (5' 6\")   Wt 97.1 kg (214 lb) "   BMI 34.54 kg/m    BMI= Body mass index is 34.54 kg/m .  Wt Readings from Last 3 Encounters:   10/22/24 97.1 kg (214 lb)   10/08/24 95.2 kg (209 lb 14.4 oz)   19 105.8 kg (233 lb 3.2 oz)       General Appearance:   no distress, normal body habitus   ENT/Mouth: membranes moist, no oral lesions or bleeding gums.      EYES:  no scleral icterus, normal conjunctivae   Neck: no carotid bruits or thyromegaly   Chest/Lungs:   lungs are clear to auscultation   Cardiovascular:   Regular. Normal first and second heart sounds with soft systolic murmur no edema bilaterally        Extremities: no cyanosis or clubbing   Skin: no xanthelasma, warm.    Neurologic: normal  bilateral, no tremors     Psychiatric: alert and oriented x3, calm        Please refer above for cardiac ROS details.        Medical History  Surgical History Family History Social History   Past Medical History:   Diagnosis Date     Abnormal glandular Papanicolaou smear of cervix 1990     Hypertension      Leiomyoma of uterus, unspecified      Other specified cardiac dysrhythmias(427.89)      Paroxysmal supraventricular tachycardia (H)     -ablation tx     Unspecified hearing loss      Past Surgical History:   Procedure Laterality Date     APPENDECTOMY       COLONOSCOPY  2004     EYE SURGERY      Lasik     HC DILATION/CURETTAGE DIAG/THER NON OB      D & C     HC REMOVAL OF TONSILS,<11 Y/O  age 5    Tonsils <12y.o.     HYSTERECTOMY, PAP NO LONGER INDICATED           HYSTERECTOMY, KESHIA  1991    for fibroids     ZZC  DELIVERY ONLY  1982    , Low Cervical     Family History   Problem Relation Age of Onset     Thyroid Disease Mother      Heart Disease Mother         heart conditions runs on moms side-bipass and heart attacks     Depression Mother         Committed suicide age 47     Thyroid Disease Brother      Other Cancer Brother         bladder cancer     Hypertension Brother      Unknown/Adopted Father       Cerebrovascular Disease Maternal Grandmother      C.A.D. Maternal Grandfather      Unknown/Adopted Paternal Grandmother      Unknown/Adopted Paternal Grandfather      Hypertension Son      Neurologic Disorder Sister         benign brain tumor     Hypertension Sister      Hypertension Brother      Diabetes Brother      Hypertension Brother      Other Cancer Brother      Thyroid Disease Brother      Hypertension Brother         Social History     Socioeconomic History     Marital status:      Spouse name: Not on file     Number of children: 1     Years of education: Not on file     Highest education level: Not on file   Occupational History     Employer: [employer]   Tobacco Use     Smoking status: Never     Smokeless tobacco: Never   Substance and Sexual Activity     Alcohol use: Yes     Comment: occasional     Drug use: No     Sexual activity: Not Currently     Partners: Male     Birth control/protection: Post-menopausal     Comment: Hyst   Other Topics Concern     Parent/sibling w/ CABG, MI or angioplasty before 65F 55M? No   Social History Narrative     Not on file     Social Drivers of Health     Financial Resource Strain: Low Risk  (10/8/2024)    Financial Resource Strain      Within the past 12 months, have you or your family members you live with been unable to get utilities (heat, electricity) when it was really needed?: No   Food Insecurity: Low Risk  (10/8/2024)    Food Insecurity      Within the past 12 months, did you worry that your food would run out before you got money to buy more?: No      Within the past 12 months, did the food you bought just not last and you didn t have money to get more?: No   Transportation Needs: Low Risk  (10/8/2024)    Transportation Needs      Within the past 12 months, has lack of transportation kept you from medical appointments, getting your medicines, non-medical meetings or appointments, work, or from getting things that you need?: No  "  Physical Activity: Not on file   Stress: Not on file   Social Connections: Not on file   Interpersonal Safety: Low Risk  (10/8/2024)    Interpersonal Safety      Do you feel physically and emotionally safe where you currently live?: Yes      Within the past 12 months, have you been hit, slapped, kicked or otherwise physically hurt by someone?: No      Within the past 12 months, have you been humiliated or emotionally abused in other ways by your partner or ex-partner?: No   Housing Stability: Low Risk  (10/8/2024)    Housing Stability      Do you have housing? : Yes      Are you worried about losing your housing?: No           Medications  Allergies   Current Outpatient Medications   Medication Sig Dispense Refill     aspirin 81 MG EC tablet Take 81 mg by mouth daily.       atenolol (TENORMIN) 25 MG tablet Take 1 tablet (25 mg) by mouth daily. 90 tablet 3     magnesium glycinate 100 MG CAPS capsule Take 200 mg by mouth daily.       MULTIVITAMIN OR Take 2 tablets by mouth daily.       olmesartan (BENICAR) 5 MG tablet Take 1 tablet (5 mg) by mouth daily. 90 tablet 3       Allergies   Allergen Reactions     Amoxicillin Hives     Sulfa Antibiotics Hives          Lab Results    Chemistry/lipid CBC Cardiac Enzymes/BNP/TSH/INR   Recent Labs   Lab Test 10/04/18  0753   CHOL 214*   HDL 50   *   TRIG 114     Recent Labs   Lab Test 10/04/18  0753   *     Recent Labs   Lab Test 10/08/24  1431      POTASSIUM 4.2   CHLORIDE 102   CO2 26   GLC 86   BUN 16.0   CR 0.68   GFRESTIMATED >90   GHADA 9.4     Recent Labs   Lab Test 10/08/24  1431 10/04/18  0753 03/08/17  0928   CR 0.68 0.65 0.63     No results for input(s): \"A1C\" in the last 98232 hours.       Recent Labs   Lab Test 03/08/17  0928   WBC 8.5   HGB 13.6   HCT 39.9   MCV 91        Recent Labs   Lab Test 03/08/17  0928   HGB 13.6    No results for input(s): \"TROPONINI\" in the last 38770 hours.  No results for input(s): \"BNP\", \"NTBNPI\", \"NTBNP\" in " "the last 37144 hours.  Recent Labs   Lab Test 10/08/24  1431   TSH 1.68     No results for input(s): \"INR\" in the last 38592 hours.     Anali Frankel MD                                      Thank you for allowing me to participate in the care of your patient.      Sincerely,     Anali Frankel MD     [facility]  cc:   Referred Self, MD  No address on file      "

## 2024-10-25 NOTE — PROGRESS NOTES
"  HEART CARE ENCOUNTER CONSULTATON NOTE      [facility]  [phone]      Assessment/Recommendations   Assessment:  1.  Valvular heart disease: 3+ mitral regurgitation followed in [facility] recently moved and establishing care here.  2.  Hypertension: Well-controlled  3.  History of SVT status post ablation at [facility] in 2003  4.  Hearing loss   5.  Depression    Plan:  1.  Recommend repeat echocardiogram this year to reassess mitral valve disease  2.  Continue on atenolol, losartan for blood pressure control  3.  Recommend repeat echocardiogram 1 year follow-up at that time     History of Present Illness/Subjective    HPI: Hannah Moreno is a 64 year old female with history of SVT status post ablation in 2003, hypertension, hearing loss, depression, mitral regurgitation (graded 3+ moderate-severe) on last echocardiogram a year ago who I am seeing today to establish care.  She moved here in June from [facility] to be closer to her grandchildren.  She was first detected to have mitral regurgitation when she underwent a stress echocardiogram back in 2018 and has been stable since that time.  She does not exercise much but she does walk daily about 2030 minutes a day and has not noted any problems with breathing difficulty, chest pain or palpitations.  The longitudinal plan of care for the diagnosis(es)/condition(s) as documented were addressed during this visit. Due to the added complexity in care, I will continue to support Hannah in the subsequent management and with ongoing continuity of care.        Physical Examination  Review of Systems   Vitals: /66 (BP Location: Left arm, Patient Position: Sitting, Cuff Size: Adult Large)   Pulse 68   Resp 17   Ht 1.676 m (5' 6\")   Wt 97.1 kg (214 lb)   BMI 34.54 kg/m    BMI= Body mass index is 34.54 kg/m .  Wt Readings from Last 3 Encounters:   10/22/24 97.1 kg (214 lb)   10/08/24 95.2 kg (209 lb 14.4 oz)   01/04/19 105.8 kg (233 lb " 3.2 oz)       General Appearance:   no distress, normal body habitus   ENT/Mouth: membranes moist, no oral lesions or bleeding gums.      EYES:  no scleral icterus, normal conjunctivae   Neck: no carotid bruits or thyromegaly   Chest/Lungs:   lungs are clear to auscultation   Cardiovascular:   Regular. Normal first and second heart sounds with soft systolic murmur no edema bilaterally        Extremities: no cyanosis or clubbing   Skin: no xanthelasma, warm.    Neurologic: normal  bilateral, no tremors     Psychiatric: alert and oriented x3, calm        Please refer above for cardiac ROS details.        Medical History  Surgical History Family History Social History   Past Medical History:   Diagnosis Date    Abnormal glandular Papanicolaou smear of cervix 1990    Hypertension     Leiomyoma of uterus, unspecified     Other specified cardiac dysrhythmias(427.89)     Paroxysmal supraventricular tachycardia (H)     -ablation tx    Unspecified hearing loss      Past Surgical History:   Procedure Laterality Date    APPENDECTOMY      COLONOSCOPY  2004    EYE SURGERY      Lasik    HC DILATION/CURETTAGE DIAG/THER NON OB      D & C    HC REMOVAL OF TONSILS,<13 Y/O  age 5    Tonsils <12y.o.    HYSTERECTOMY, PAP NO LONGER INDICATED          HYSTERECTOMY, KESHIA  1991    for fibroids    ZZC  DELIVERY ONLY  1982    , Low Cervical     Family History   Problem Relation Age of Onset    Thyroid Disease Mother     Heart Disease Mother         heart conditions runs on moms side-bipass and heart attacks    Depression Mother         Committed suicide age 47    Thyroid Disease Brother     Other Cancer Brother         bladder cancer    Hypertension Brother     Unknown/Adopted Father     Cerebrovascular Disease Maternal Grandmother     C.A.D. Maternal Grandfather     Unknown/Adopted Paternal Grandmother     Unknown/Adopted Paternal Grandfather     Hypertension Son     Neurologic Disorder  Sister         benign brain tumor    Hypertension Sister     Hypertension Brother     Diabetes Brother     Hypertension Brother     Other Cancer Brother     Thyroid Disease Brother     Hypertension Brother         Social History     Socioeconomic History    Marital status:      Spouse name: Not on file    Number of children: 1    Years of education: Not on file    Highest education level: Not on file   Occupational History     Employer: [employer]   Tobacco Use    Smoking status: Never    Smokeless tobacco: Never   Substance and Sexual Activity    Alcohol use: Yes     Comment: occasional    Drug use: No    Sexual activity: Not Currently     Partners: Male     Birth control/protection: Post-menopausal     Comment: Hyst   Other Topics Concern    Parent/sibling w/ CABG, MI or angioplasty before 65F 55M? No   Social History Narrative    Not on file     Social Drivers of Health     Financial Resource Strain: Low Risk  (10/8/2024)    Financial Resource Strain     Within the past 12 months, have you or your family members you live with been unable to get utilities (heat, electricity) when it was really needed?: No   Food Insecurity: Low Risk  (10/8/2024)    Food Insecurity     Within the past 12 months, did you worry that your food would run out before you got money to buy more?: No     Within the past 12 months, did the food you bought just not last and you didn t have money to get more?: No   Transportation Needs: Low Risk  (10/8/2024)    Transportation Needs     Within the past 12 months, has lack of transportation kept you from medical appointments, getting your medicines, non-medical meetings or appointments, work, or from getting things that you need?: No   Physical Activity: Not on file   Stress: Not on file   Social Connections: Not on file   Interpersonal Safety: Low Risk  (10/8/2024)    Interpersonal Safety     Do you feel physically and emotionally safe where you currently live?: Yes  "    Within the past 12 months, have you been hit, slapped, kicked or otherwise physically hurt by someone?: No     Within the past 12 months, have you been humiliated or emotionally abused in other ways by your partner or ex-partner?: No   Housing Stability: Low Risk  (10/8/2024)    Housing Stability     Do you have housing? : Yes     Are you worried about losing your housing?: No           Medications  Allergies   Current Outpatient Medications   Medication Sig Dispense Refill    aspirin 81 MG EC tablet Take 81 mg by mouth daily.      atenolol (TENORMIN) 25 MG tablet Take 1 tablet (25 mg) by mouth daily. 90 tablet 3    magnesium glycinate 100 MG CAPS capsule Take 200 mg by mouth daily.      MULTIVITAMIN OR Take 2 tablets by mouth daily.      olmesartan (BENICAR) 5 MG tablet Take 1 tablet (5 mg) by mouth daily. 90 tablet 3       Allergies   Allergen Reactions    Amoxicillin Hives    Sulfa Antibiotics Hives          Lab Results    Chemistry/lipid CBC Cardiac Enzymes/BNP/TSH/INR   Recent Labs   Lab Test 10/04/18  0753   CHOL 214*   HDL 50   *   TRIG 114     Recent Labs   Lab Test 10/04/18  0753   *     Recent Labs   Lab Test 10/08/24  1431      POTASSIUM 4.2   CHLORIDE 102   CO2 26   GLC 86   BUN 16.0   CR 0.68   GFRESTIMATED >90   GHADA 9.4     Recent Labs   Lab Test 10/08/24  1431 10/04/18  0753 03/08/17  0928   CR 0.68 0.65 0.63     No results for input(s): \"A1C\" in the last 75629 hours.       Recent Labs   Lab Test 03/08/17  0928   WBC 8.5   HGB 13.6   HCT 39.9   MCV 91        Recent Labs   Lab Test 03/08/17 0928   HGB 13.6    No results for input(s): \"TROPONINI\" in the last 44325 hours.  No results for input(s): \"BNP\", \"NTBNPI\", \"NTBNP\" in the last 33869 hours.  Recent Labs   Lab Test 10/08/24  1431   TSH 1.68     No results for input(s): \"INR\" in the last 11106 hours.     Anali Frankel MD                                      "

## 2024-11-19 ENCOUNTER — HOSPITAL ENCOUNTER (OUTPATIENT)
Dept: CARDIOLOGY | Facility: HOSPITAL | Age: 64
Discharge: HOME | End: 2024-11-19
Attending: INTERNAL MEDICINE
Payer: COMMERCIAL

## 2024-11-19 DIAGNOSIS — I34.0 NONRHEUMATIC MITRAL VALVE REGURGITATION: ICD-10-CM

## 2024-11-19 LAB — LVEF ECHO: NORMAL

## 2024-11-19 PROCEDURE — 93306 TTE W/DOPPLER COMPLETE: CPT | Mod: 26 | Performed by: INTERNAL MEDICINE

## 2024-11-19 PROCEDURE — 93306 TTE W/DOPPLER COMPLETE: CPT

## 2024-12-01 ENCOUNTER — MYC MEDICAL ADVICE (OUTPATIENT)
Dept: CARDIOLOGY | Facility: CLINIC | Age: 64
End: 2024-12-01
Payer: COMMERCIAL

## 2024-12-03 NOTE — TELEPHONE ENCOUNTER
----- Message -----  From: Anali Frankel MD  Sent: 12/3/2024  12:59 PM CST  To: [facility] Cv Rn Team X  Subject: FW: Echocardiogram results                       Just spoke with her about the results.  I do not believe her valve looks severe on the recent echocardiogram and agree with readers impression.  I would recommend a repeat echocardiogram in 1 years time and can follow-up at that time    ==  Noted response from [facility]. Addressed [employer] message on the phone. Noted yearly follow-up and Echo pending 10/2025. JIMMIE

## 2025-03-04 ENCOUNTER — HOSPITAL ENCOUNTER (EMERGENCY)
Facility: HOSPITAL | Age: 65
Discharge: LEFT BEFORE DISCHARGE | End: 2025-03-04
Attending: STUDENT IN AN ORGANIZED HEALTH CARE EDUCATION/TRAINING PROGRAM | Admitting: STUDENT IN AN ORGANIZED HEALTH CARE EDUCATION/TRAINING PROGRAM
Payer: COMMERCIAL

## 2025-03-04 VITALS
HEART RATE: 64 BPM | HEIGHT: 67 IN | SYSTOLIC BLOOD PRESSURE: 194 MMHG | BODY MASS INDEX: 36.1 KG/M2 | OXYGEN SATURATION: 99 % | TEMPERATURE: 97.9 F | RESPIRATION RATE: 16 BRPM | WEIGHT: 230 LBS | DIASTOLIC BLOOD PRESSURE: 92 MMHG

## 2025-03-04 DIAGNOSIS — R07.9 CHEST PAIN, UNSPECIFIED TYPE: ICD-10-CM

## 2025-03-04 LAB
ANION GAP SERPL CALCULATED.3IONS-SCNC: 9 MMOL/L (ref 7–15)
BASOPHILS # BLD AUTO: 0 10E3/UL (ref 0–0.2)
BASOPHILS NFR BLD AUTO: 1 %
BUN SERPL-MCNC: 13.2 MG/DL (ref 8–23)
CALCIUM SERPL-MCNC: 9.6 MG/DL (ref 8.8–10.4)
CHLORIDE SERPL-SCNC: 103 MMOL/L (ref 98–107)
CREAT SERPL-MCNC: 0.65 MG/DL (ref 0.51–0.95)
EGFRCR SERPLBLD CKD-EPI 2021: >90 ML/MIN/1.73M2
EOSINOPHIL # BLD AUTO: 0.2 10E3/UL (ref 0–0.7)
EOSINOPHIL NFR BLD AUTO: 2 %
ERYTHROCYTE [DISTWIDTH] IN BLOOD BY AUTOMATED COUNT: 12.4 % (ref 10–15)
GLUCOSE SERPL-MCNC: 97 MG/DL (ref 70–99)
HCO3 SERPL-SCNC: 27 MMOL/L (ref 22–29)
HCT VFR BLD AUTO: 38.3 % (ref 35–47)
HGB BLD-MCNC: 13.5 G/DL (ref 11.7–15.7)
HOLD SPECIMEN: NORMAL
HOLD SPECIMEN: NORMAL
IMM GRANULOCYTES # BLD: 0 10E3/UL
IMM GRANULOCYTES NFR BLD: 0 %
LYMPHOCYTES # BLD AUTO: 2.3 10E3/UL (ref 0.8–5.3)
LYMPHOCYTES NFR BLD AUTO: 34 %
MCH RBC QN AUTO: 31.2 PG (ref 26.5–33)
MCHC RBC AUTO-ENTMCNC: 35.2 G/DL (ref 31.5–36.5)
MCV RBC AUTO: 89 FL (ref 78–100)
MONOCYTES # BLD AUTO: 0.7 10E3/UL (ref 0–1.3)
MONOCYTES NFR BLD AUTO: 11 %
NEUTROPHILS # BLD AUTO: 3.5 10E3/UL (ref 1.6–8.3)
NEUTROPHILS NFR BLD AUTO: 52 %
NRBC # BLD AUTO: 0 10E3/UL
NRBC BLD AUTO-RTO: 0 /100
PLATELET # BLD AUTO: 277 10E3/UL (ref 150–450)
POTASSIUM SERPL-SCNC: 4 MMOL/L (ref 3.4–5.3)
RBC # BLD AUTO: 4.33 10E6/UL (ref 3.8–5.2)
SODIUM SERPL-SCNC: 139 MMOL/L (ref 135–145)
TROPONIN T SERPL HS-MCNC: <6 NG/L
WBC # BLD AUTO: 6.8 10E3/UL (ref 4–11)

## 2025-03-04 PROCEDURE — 80048 BASIC METABOLIC PNL TOTAL CA: CPT | Performed by: STUDENT IN AN ORGANIZED HEALTH CARE EDUCATION/TRAINING PROGRAM

## 2025-03-04 PROCEDURE — 99284 EMERGENCY DEPT VISIT MOD MDM: CPT | Mod: 25

## 2025-03-04 PROCEDURE — 36415 COLL VENOUS BLD VENIPUNCTURE: CPT | Performed by: STUDENT IN AN ORGANIZED HEALTH CARE EDUCATION/TRAINING PROGRAM

## 2025-03-04 PROCEDURE — 85004 AUTOMATED DIFF WBC COUNT: CPT | Performed by: STUDENT IN AN ORGANIZED HEALTH CARE EDUCATION/TRAINING PROGRAM

## 2025-03-04 PROCEDURE — 84484 ASSAY OF TROPONIN QUANT: CPT | Performed by: STUDENT IN AN ORGANIZED HEALTH CARE EDUCATION/TRAINING PROGRAM

## 2025-03-04 PROCEDURE — 93005 ELECTROCARDIOGRAM TRACING: CPT | Performed by: STUDENT IN AN ORGANIZED HEALTH CARE EDUCATION/TRAINING PROGRAM

## 2025-03-04 PROCEDURE — 85048 AUTOMATED LEUKOCYTE COUNT: CPT | Performed by: STUDENT IN AN ORGANIZED HEALTH CARE EDUCATION/TRAINING PROGRAM

## 2025-03-04 ASSESSMENT — COLUMBIA-SUICIDE SEVERITY RATING SCALE - C-SSRS
6. HAVE YOU EVER DONE ANYTHING, STARTED TO DO ANYTHING, OR PREPARED TO DO ANYTHING TO END YOUR LIFE?: NO
2. HAVE YOU ACTUALLY HAD ANY THOUGHTS OF KILLING YOURSELF IN THE PAST MONTH?: NO
1. IN THE PAST MONTH, HAVE YOU WISHED YOU WERE DEAD OR WISHED YOU COULD GO TO SLEEP AND NOT WAKE UP?: NO

## 2025-03-04 NOTE — ED TRIAGE NOTES
Patient complains of left sided chest pain that radiates to the left arm and back. Pain began around 3 pm today. Feels like an elephant squeezing her chest.  No shortness of breathe.   Has had a lot stress lately with work.  Took 81 mg of aspirin.   History of mitral value leakage.

## 2025-03-04 NOTE — ED PROVIDER NOTES
EMERGENCY DEPARTMENT ENCOUNTER       ED Course & Medical Decision Making     4:00 PM I met with the patient to gather history and perform an initial exam.   6:00 PM Updated patient about her first troponin being negative  7:08 PM RN informed me that patient is no longer in the [facility] when they called for her, suspect she left AMA    Final Impression  64 year old female with presents for evaluation of chest pain that started about 1 hour crushable, describes as like an elephant squeezing her chest.  Has a history of mitral regurgitation for which she follows with cardiology and had recent echo a few months ago that showed mild +1 mitral regurgitation, though otherwise negative cardiac history, denies ever having any heart attacks, stenting, or other severe cardiac abnormalities, though does have some family history of cardiac issues.  Patient states that she was at rest, working at her desk when the chest pain started and has remained constant since.  Denies any shortness of breath or difficulty breathing.  Chest pain does not radiate.  Patient is otherwise ambulatory, not in any acute distress.  Denies any PE risk factors.  Initial EKG nonischemic.  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    Initial troponin negative.  Planned on delta troponin at about 6:30 PM, had updated patient of the initial negative troponin though we would still need to do a repeat.  When RN called patient to do repeat lab draw patient was no longer in the waiting room, suspect she left AMA    Medical Decision Making  Supplemental history from: NA  External Record(s) reviewed as documented below;  10/22/2024, [facility] cardiology clinic notes, seen for follow-up of SVT s/p ablation in 2003, hypertension, mitral regurgitation graded 3+ moderate-severe.   Plan for repeat echo, continue blood pressure meds and repeat echo in a year.  11/19/2024, complete echocardiogram,, shows EF 60 to 65%, normal RV size and function, left atrium moderate to severely dilated, mild +1 mitral regurgitation.  Chart documentation includes differential considered and any EKGs or imaging independently interpreted by provider, where specified.  In additional to work up documented, I considered the following work up: Considered D-dimer to screen for PE, though patient denies any shortness of breath, no recent travel, surgery, history of DVT/PEs, or other pulmonary embolism risk factors  DDx considered but not limited to: ACS, cardiac arrhythmia  Care impacted by chronic illness: Obesity, major depressive disorder  Disposition considerations: Discharge. No recommendations on prescription strength medication(s). See documentation for any additional details.        Medications - No data to display    New Prescriptions    No medications on file     Modified Medications    No medications on file     Final Impression     1. Chest pain, unspecified type        Chief Complaint     Chief Complaint   Patient presents with    Chest Pain     Patient complains of left sided chest pain that radiates to the left arm and back. Pain began around 3 pm today. Feels like an elephant squeezing her chest.  No shortness of breathe.   Has had a lot stress lately with work.  Took 81 mg of aspirin.   History of mitral value leakage.     HPI     Hannah Moreno is a 64 year old female who presents for evaluation of chest pain.    Patient reports an onset of left-sided chest pain that started at around 1500 today. It feels like an elephant is squeezing her chest. She saw cardiology a couple months ago and says that they found that she has mild regurgitation. She is on aspirin and 2 blood pressure medications. She works at home. Denies history of heart attack, heart stent, blood clots, PE. Denies any other complaints at  "this time.      I, Jacquelyn Bell am serving as a scribe to document services personally performed by Dr. Scottie Daniel MD, based on my observation and the provider's statements to me. I, Dr. Scottie Daniel MD attest that Jacquelyn Bell is acting in a scribe capacity, has observed my performance of the services and has documented them in accordance with my direction.    Physical Exam     BP (!) 194/92   Pulse 64   Temp 97.9  F (36.6  C) (Oral)   Resp 16   Ht 1.702 m (5' 7\")   Wt 104.3 kg (230 lb)   SpO2 99%   BMI 36.02 kg/m    Constitutional: Awake, alert, in no acute distress.  Head: Normocephalic, atraumatic.  ENT: Mucous membranes moist.  Eyes: Conjunctiva normal.  Respiratory: Respirations even, unlabored, in no acute respiratory distress.  Cardiovascular: Regular rate and rhythm. Good peripheral perfusion.  2/6 systolic murmur at the right upper sternal border  GI: Abdomen soft, non-tender.  Musculoskeletal: Moves all 4 extremities equally.  Integument: Warm, dry.  Neurologic: Alert & oriented x 3. Normal speech. Grossly normal motor and sensory function. No focal deficits noted.  Psychiatric: Normal mood    Labs & Imaging       Results for orders placed or performed during the hospital encounter of 03/04/25   Basic metabolic panel   Result Value Ref Range    Sodium 139 135 - 145 mmol/L    Potassium 4.0 3.4 - 5.3 mmol/L    Chloride 103 98 - 107 mmol/L    Carbon Dioxide (CO2) 27 22 - 29 mmol/L    Anion Gap 9 7 - 15 mmol/L    Urea Nitrogen 13.2 8.0 - 23.0 mg/dL    Creatinine 0.65 0.51 - 0.95 mg/dL    GFR Estimate >90 >60 mL/min/1.73m2    Calcium 9.6 8.8 - 10.4 mg/dL    Glucose 97 70 - 99 mg/dL   Result Value Ref Range    Troponin T, High Sensitivity <6 <=14 ng/L   CBC with platelets and differential   Result Value Ref Range    WBC Count 6.8 4.0 - 11.0 10e3/uL    RBC Count 4.33 3.80 - 5.20 10e6/uL    Hemoglobin 13.5 11.7 - 15.7 g/dL    Hematocrit 38.3 35.0 - 47.0 %    MCV 89 78 - 100 fL    MCH 31.2 26.5 - 33.0 " pg    MCHC 35.2 31.5 - 36.5 g/dL    RDW 12.4 10.0 - 15.0 %    Platelet Count 277 150 - 450 10e3/uL    % Neutrophils 52 %    % Lymphocytes 34 %    % Monocytes 11 %    % Eosinophils 2 %    % Basophils 1 %    % Immature Granulocytes 0 %    NRBCs per 100 WBC 0 <1 /100    Absolute Neutrophils 3.5 1.6 - 8.3 10e3/uL    Absolute Lymphocytes 2.3 0.8 - 5.3 10e3/uL    Absolute Monocytes 0.7 0.0 - 1.3 10e3/uL    Absolute Eosinophils 0.2 0.0 - 0.7 10e3/uL    Absolute Basophils 0.0 0.0 - 0.2 10e3/uL    Absolute Immature Granulocytes 0.0 <=0.4 10e3/uL    Absolute NRBCs 0.0 10e3/uL   Extra Blue Top Tube   Result Value Ref Range    Hold Specimen JIC    Extra Red Top Tube   Result Value Ref Range    Hold Specimen JIC        EKG     EKG reviewed and independently interpreted as below;  Normal sinus rhythm, rate 69.  .  QRS 90.  QTc 471.  No STEMI    Procedures          Scottie Daniel MD  03/04/25 6820

## 2025-03-05 LAB
ATRIAL RATE - MUSE: 69 BPM
DIASTOLIC BLOOD PRESSURE - MUSE: NORMAL MMHG
INTERPRETATION ECG - MUSE: NORMAL
P AXIS - MUSE: 50 DEGREES
PR INTERVAL - MUSE: 168 MS
QRS DURATION - MUSE: 90 MS
QT - MUSE: 440 MS
QTC - MUSE: 471 MS
R AXIS - MUSE: 28 DEGREES
SYSTOLIC BLOOD PRESSURE - MUSE: NORMAL MMHG
T AXIS - MUSE: 33 DEGREES
VENTRICULAR RATE- MUSE: 69 BPM

## 2025-03-05 NOTE — ED NOTES
Called patient she states she will remove her own IV and will not come back to have removed or have 2nd troponin drawn.

## 2025-03-17 SDOH — HEALTH STABILITY: PHYSICAL HEALTH: ON AVERAGE, HOW MANY MINUTES DO YOU ENGAGE IN EXERCISE AT THIS LEVEL?: 0 MIN

## 2025-03-17 SDOH — HEALTH STABILITY: PHYSICAL HEALTH: ON AVERAGE, HOW MANY DAYS PER WEEK DO YOU ENGAGE IN MODERATE TO STRENUOUS EXERCISE (LIKE A BRISK WALK)?: 0 DAYS

## 2025-03-17 ASSESSMENT — SOCIAL DETERMINANTS OF HEALTH (SDOH): HOW OFTEN DO YOU GET TOGETHER WITH FRIENDS OR RELATIVES?: ONCE A WEEK

## 2025-03-19 ENCOUNTER — OFFICE VISIT (OUTPATIENT)
Dept: FAMILY MEDICINE | Facility: CLINIC | Age: 65
End: 2025-03-19
Payer: COMMERCIAL

## 2025-03-19 VITALS
HEART RATE: 60 BPM | HEIGHT: 67 IN | WEIGHT: 240.2 LBS | BODY MASS INDEX: 37.7 KG/M2 | TEMPERATURE: 98.4 F | RESPIRATION RATE: 16 BRPM | OXYGEN SATURATION: 97 % | DIASTOLIC BLOOD PRESSURE: 84 MMHG | SYSTOLIC BLOOD PRESSURE: 134 MMHG

## 2025-03-19 DIAGNOSIS — F32.2 SEVERE SINGLE CURRENT EPISODE OF MAJOR DEPRESSIVE DISORDER, WITHOUT PSYCHOTIC FEATURES (H): ICD-10-CM

## 2025-03-19 DIAGNOSIS — I10 ESSENTIAL HYPERTENSION, BENIGN: ICD-10-CM

## 2025-03-19 DIAGNOSIS — Z13.220 LIPID SCREENING: ICD-10-CM

## 2025-03-19 DIAGNOSIS — E66.01 CLASS 2 SEVERE OBESITY WITH BODY MASS INDEX (BMI) OF 35 TO 39.9 WITH SERIOUS COMORBIDITY (H): ICD-10-CM

## 2025-03-19 DIAGNOSIS — Z12.31 VISIT FOR SCREENING MAMMOGRAM: ICD-10-CM

## 2025-03-19 DIAGNOSIS — R63.5 WEIGHT GAIN: ICD-10-CM

## 2025-03-19 DIAGNOSIS — F33.2 MAJOR DEPRESSIVE DISORDER, RECURRENT EPISODE, SEVERE WITH ANXIOUS DISTRESS (H): ICD-10-CM

## 2025-03-19 DIAGNOSIS — R00.0 TACHYCARDIA: ICD-10-CM

## 2025-03-19 DIAGNOSIS — E66.812 CLASS 2 SEVERE OBESITY WITH BODY MASS INDEX (BMI) OF 35 TO 39.9 WITH SERIOUS COMORBIDITY (H): ICD-10-CM

## 2025-03-19 DIAGNOSIS — Z00.00 ROUTINE GENERAL MEDICAL EXAMINATION AT A HEALTH CARE FACILITY: Primary | ICD-10-CM

## 2025-03-19 LAB
CHOLEST SERPL-MCNC: 204 MG/DL
CREAT UR-MCNC: 31.6 MG/DL
FASTING STATUS PATIENT QL REPORTED: YES
HDLC SERPL-MCNC: 56 MG/DL
LDLC SERPL CALC-MCNC: 128 MG/DL
MICROALBUMIN UR-MCNC: <12 MG/L
MICROALBUMIN/CREAT UR: NORMAL MG/G{CREAT}
NONHDLC SERPL-MCNC: 148 MG/DL
TRIGL SERPL-MCNC: 98 MG/DL
TSH SERPL DL<=0.005 MIU/L-ACNC: 2.04 UIU/ML (ref 0.3–4.2)
VIT D+METAB SERPL-MCNC: 44 NG/ML (ref 20–50)

## 2025-03-19 PROCEDURE — G2211 COMPLEX E/M VISIT ADD ON: HCPCS

## 2025-03-19 PROCEDURE — 99214 OFFICE O/P EST MOD 30 MIN: CPT | Mod: 25

## 2025-03-19 PROCEDURE — 36415 COLL VENOUS BLD VENIPUNCTURE: CPT

## 2025-03-19 PROCEDURE — 96127 BRIEF EMOTIONAL/BEHAV ASSMT: CPT

## 2025-03-19 PROCEDURE — 99396 PREV VISIT EST AGE 40-64: CPT

## 2025-03-19 PROCEDURE — 3075F SYST BP GE 130 - 139MM HG: CPT

## 2025-03-19 PROCEDURE — 3079F DIAST BP 80-89 MM HG: CPT

## 2025-03-19 RX ORDER — VITAMIN B COMPLEX
TABLET ORAL DAILY
COMMUNITY

## 2025-03-19 NOTE — PROGRESS NOTES
"Preventive Care Visit  [facility] KAIT Elder CNP, Family Medicine  Mar 19, 2025      Assessment & Plan     Routine general medical examination at a health care facility  Declines vaccines    Essential hypertension, benign  BP well controlled on olmesartan and atenolol. BMP normal from 3/4. Will check albumin and continue to monitor.   - Albumin Random Urine Quantitative with Creat Ratio    Major depressive disorder, recurrent episode, severe with anxious distress (H)  Well controlled    Severe single current episode of major depressive disorder, without psychotic features (H)  Well controlled.     Weight gain  Pt has gained weight since last year, is focusing back on exercise and mindful eating. Discussed healthy diet and exercise, pt has lost weight before and feels confident that she can do this. Will check labs, followup if any concerns  - TSH with free T4 reflex  - Vitamin D Deficiency    Lipid screening  - Lipid panel reflex to direct LDL Non-fasting    Visit for screening mammogram  Had in June last year in [facility] Screen Bilateral w/Reza    Tachycardia  Pt has had a couple instances of tachycardia she has noticed in the last week. Has hx of ablation with SVT 20 years ago, recommended ziopatch monitor, pt declines at this time as she thinks it may be exercise related. Will followup if she continues to feel palpitations.             BMI  Estimated body mass index is 37.62 kg/m  as calculated from the following:    Height as of this encounter: 1.702 m (5' 7\").    Weight as of this encounter: 109 kg (240 lb 3.2 oz).       Counseling  Appropriate preventive services were addressed with this patient via screening, questionnaire, or discussion as appropriate for fall prevention, nutrition, physical activity, Tobacco-use cessation, social engagement, weight loss and cognition.  Checklist reviewing preventive services available has been given to the patient.  Reviewed patient's diet, " addressing concerns and/or questions.   She is at risk for psychosocial distress and has been provided with information to reduce risk.           Pablo Young is a 64 year old, presenting for the following:  Physical        3/19/2025     8:09 AM   Additional Questions   Roomed by Anna MCINTYRE   Accompanied by self          HPI    Here for physical and concerned about weight gain and check thyroid mom and brother have thyroid issue.    Wt Readings from Last 4 Encounters:   03/19/25 109 kg (240 lb 3.2 oz)   10/22/24 97.1 kg (214 lb)   10/08/24 95.2 kg (209 lb 14.4 oz)   01/04/19 105.8 kg (233 lb 3.2 oz)     Pt has very stressful job.   Works with [employer] as a     Has gained weight after stopping restrictive diet    Has started taking a 30 minute walk daily    Has started taking D3 and fish oil, reports mood has been good.     Alcohol: less than weekly  Nicotine: none  Drugs: none    Declines STI screening    Had mammogram in June of last year      Has been having more tachycardia recently- hx of SVT. Had ablation 20 years ago.     Advance Care Planning  Patient does not have a Health Care Directive: Discussed advance care planning with patient; however, patient declined at this time.      3/17/2025   General Health   How would you rate your overall physical health? Good   Feel stress (tense, anxious, or unable to sleep) Rather much   (!) STRESS CONCERN      3/17/2025   Nutrition   Three or more servings of calcium each day? Yes   Diet: Regular (no restrictions)   How many servings of fruit and vegetables per day? (!) 2-3   How many sweetened beverages each day? 0-1         3/17/2025   Exercise   Days per week of moderate/strenous exercise 0 days   Average minutes spent exercising at this level 0 min   (!) EXERCISE CONCERN      3/17/2025   Social Factors   Frequency of gathering with friends or relatives Once a week   Worry food won't last until get money to buy more No   Food not last or not have enough  "money for food? No   Do you have housing? (Housing is defined as stable permanent housing and does not include staying ouside in a car, in a tent, in an abandoned building, in an overnight shelter, or couch-surfing.) Yes   Are you worried about losing your housing? No   Lack of transportation? No   Unable to get utilities (heat,electricity)? No         3/17/2025   Fall Risk   Fallen 2 or more times in the past year? No   Trouble with walking or balance? No          3/17/2025   Dental   Dentist two times every year? Yes           Today's PHQ-9 Score:       10/8/2024     9:12 AM   PHQ-9 SCORE   PHQ-9 Total Score MyChart 1 (Minimal depression)   PHQ-9 Total Score 1           3/17/2025   Substance Use   Alcohol more than 3/day or more than 7/wk No   Do you use any other substances recreationally? No     Social History     Tobacco Use    Smoking status: Never    Smokeless tobacco: Never   Substance Use Topics    Alcohol use: Yes     Comment: occasional    Drug use: No          Mammogram Screening - Mammogram every 1-2 years updated in Health Maintenance based on mutual decision making          3/17/2025   One time HIV Screening   Previous HIV test? Yes         3/17/2025   STI Screening   New sexual partner(s) since last STI/HIV test? No     History of abnormal Pap smear: Status post hysterectomy.         1/8/2008    12:00 AM 5/10/2005    12:00 AM   PAP / HPV   PAP (Historical) NIL  NIL      ASCVD Risk   The ASCVD Risk score (Vini GRAFF, et al., 2019) failed to calculate for the following reasons:    Cannot find a previous HDL lab    Cannot find a previous total cholesterol lab           Reviewed and updated as needed this visit by Provider                         Objective    Exam  /84   Pulse 60   Temp 98.4  F (36.9  C) (Tympanic)   Resp 16   Ht 1.702 m (5' 7\")   Wt 109 kg (240 lb 3.2 oz)   SpO2 97%   BMI 37.62 kg/m     Estimated body mass index is 37.62 kg/m  as calculated from the following:    " "Height as of this encounter: 1.702 m (5' 7\").    Weight as of this encounter: 109 kg (240 lb 3.2 oz).    Physical Exam  GENERAL: alert and no distress  EYES: Eyes grossly normal to inspection, PERRL and conjunctivae and sclerae normal  HENT: ear canals and TM's normal, nose and mouth without ulcers or lesions  NECK: no adenopathy, no asymmetry, masses, or scars  RESP: lungs clear to auscultation - no rales, rhonchi or wheezes  CV: regular rate and rhythm, normal S1 S2, no S3 or S4, no murmur, click or rub, no peripheral edema  ABDOMEN: soft, nontender, no hepatosplenomegaly, no masses and bowel sounds normal  MS: no gross musculoskeletal defects noted, no edema  SKIN: no suspicious lesions or rashes  NEURO: Normal strength and tone, mentation intact and speech normal  PSYCH: mentation appears normal, affect normal/bright        Signed Electronically by: KAIT Morales CNP    "

## 2025-03-19 NOTE — PATIENT INSTRUCTIONS
Call [phone] to schedule mammogram      Patient Education   Preventive Care Advice   This is general advice given by our system to help you stay healthy. However, your care team may have specific advice just for you. Please talk to your care team about your preventive care needs.  Nutrition  Eat 5 or more servings of fruits and vegetables each day.  Try wheat bread, brown rice and whole grain pasta (instead of white bread, rice, and pasta).  Get enough calcium and vitamin D. Check the label on foods and aim for 100% of the RDA (recommended daily allowance).  Lifestyle  Exercise at least 150 minutes each week  (30 minutes a day, 5 days a week).  Do muscle strengthening activities 2 days a week. These help control your weight and prevent disease.  No smoking.  Wear sunscreen to prevent skin cancer.  Have a dental exam and cleaning every 6 months.  Yearly exams  See your health care team every year to talk about:  Any changes in your health.  Any medicines your care team has prescribed.  Preventive care, family planning, and ways to prevent chronic diseases.  Shots (vaccines)   HPV shots (up to age 26), if you've never had them before.  Hepatitis B shots (up to age 59), if you've never had them before.  COVID-19 shot: Get this shot when it's due.  Flu shot: Get a flu shot every year.  Tetanus shot: Get a tetanus shot every 10 years.  Pneumococcal, hepatitis A, and RSV shots: Ask your care team if you need these based on your risk.  Shingles shot (for age 50 and up)  General health tests  Diabetes screening:  Starting at age 35, Get screened for diabetes at least every 3 years.  If you are younger than age 35, ask your care team if you should be screened for diabetes.  Cholesterol test: At age 39, start having a cholesterol test every 5 years, or more often if advised.  Bone density scan (DEXA): At age 50, ask your care team if you should have this scan for osteoporosis (brittle bones).  Hepatitis C: Get tested at  least once in your life.  STIs (sexually transmitted infections)  Before age 24: Ask your care team if you should be screened for STIs.  After age 24: Get screened for STIs if you're at risk. You are at risk for STIs (including HIV) if:  You are sexually active with more than one person.  You don't use condoms every time.  You or a partner was diagnosed with a sexually transmitted infection.  If you are at risk for HIV, ask about PrEP medicine to prevent HIV.  Get tested for HIV at least once in your life, whether you are at risk for HIV or not.  Cancer screening tests  Cervical cancer screening: If you have a cervix, begin getting regular cervical cancer screening tests starting at age 21.  Breast cancer scan (mammogram): If you've ever had breasts, begin having regular mammograms starting at age 40. This is a scan to check for breast cancer.  Colon cancer screening: It is important to start screening for colon cancer at age 45.  Have a colonoscopy test every 10 years (or more often if you're at risk) Or, ask your provider about stool tests like a FIT test every year or Cologuard test every 3 years.  To learn more about your testing options, visit:   .  For help making a decision, visit:   https://bit.ly/ow30917.  Prostate cancer screening test: If you have a prostate, ask your care team if a prostate cancer screening test (PSA) at age 55 is right for you.  Lung cancer screening: If you are a current or former smoker ages 50 to 80, ask your care team if ongoing lung cancer screenings are right for you.  For informational purposes only. Not to replace the advice of your health care provider. Copyright   2023 [facility]. All rights reserved. Clinically reviewed by the [facility] Transitions Program. Mobile Action 759445 - REV 01/24.  Learning About Stress  What is stress?     Stress is your body's response to a hard situation. Your body can have a physical, emotional, or mental response. Stress is a  fact of life for most people, and it affects everyone differently. What causes stress for you may not be stressful for someone else.  A lot of things can cause stress. You may feel stress when you go on a job interview, take a test, or run a race. This kind of short-term stress is normal and even useful. It can help you if you need to work hard or react quickly. For example, stress can help you finish an important job on time.  Long-term stress is caused by ongoing stressful situations or events. Examples of long-term stress include long-term health problems, ongoing problems at work, or conflicts in your family. Long-term stress can harm your health.  How does stress affect your health?  When you are stressed, your body responds as though you are in danger. It makes hormones that speed up your heart, make you breathe faster, and give you a burst of energy. This is called the fight-or-flight stress response. If the stress is over quickly, your body goes back to normal and no harm is done.  But if stress happens too often or lasts too long, it can have bad effects. Long-term stress can make you more likely to get sick, and it can make symptoms of some diseases worse. If you tense up when you are stressed, you may develop neck, shoulder, or low back pain. Stress is linked to high blood pressure and heart disease.  Stress also harms your emotional health. It can make you cordon, tense, or depressed. Your relationships may suffer, and you may not do well at work or school.  What can you do to manage stress?  You can try these things to help manage stress:   Do something active. Exercise or activity can help reduce stress. Walking is a great way to get started. Even everyday activities such as housecleaning or yard work can help.  Try yoga or chang chi. These techniques combine exercise and meditation. You may need some training at first to learn them.  Do something you enjoy. For example, listen to music or go to a movie.  "Practice your hobby or do volunteer work.  Meditate. This can help you relax, because you are not worrying about what happened before or what may happen in the future.  Do guided imagery. Imagine yourself in any setting that helps you feel calm. You can use online videos, books, or a teacher to guide you.  Do breathing exercises. For example:  From a standing position, bend forward from the waist with your knees slightly bent. Let your arms dangle close to the floor.  Breathe in slowly and deeply as you return to a standing position. Roll up slowly and lift your head last.  Hold your breath for just a few seconds in the standing position.  Breathe out slowly and bend forward from the waist.  Let your feelings out. Talk, laugh, cry, and express anger when you need to. Talking with supportive friends or family, a counselor, or a galdino leader about your feelings is a healthy way to relieve stress. Avoid discussing your feelings with people who make you feel worse.  Write. It may help to write about things that are bothering you. This helps you find out how much stress you feel and what is causing it. When you know this, you can find better ways to cope.  What can you do to prevent stress?  You might try some of these things to help prevent stress:  Manage your time. This helps you find time to do the things you want and need to do.  Get enough sleep. Your body recovers from the stresses of the day while you are sleeping.  Get support. Your family, friends, and community can make a difference in how you experience stress.  Limit your news feed. Avoid or limit time on social media or news that may make you feel stressed.  Do something active. Exercise or activity can help reduce stress. Walking is a great way to get started.  Where can you learn more?  Go to https://www.healthwise.net/patiented  Enter N032 in the search box to learn more about \"Learning About Stress.\"  Current as of: October 24, 2024  Content Version: " 14.4    2954-6060 [employer].   Care instructions adapted under license by your healthcare professional. If you have questions about a medical condition or this instruction, always ask your healthcare professional. [employer] disclaims any warranty or liability for your use of this information.

## 2025-08-08 ENCOUNTER — ORDERS ONLY (AUTO-RELEASED) (OUTPATIENT)
Dept: FAMILY MEDICINE | Facility: CLINIC | Age: 65
End: 2025-08-08

## 2025-08-08 DIAGNOSIS — Z12.11 SCREEN FOR COLON CANCER: ICD-10-CM

## 2025-08-08 PROBLEM — I34.0 NONRHEUMATIC MITRAL VALVE REGURGITATION: Status: ACTIVE | Noted: 2025-08-08

## 2025-08-08 PROBLEM — D48.5 NEOPLASM OF UNCERTAIN BEHAVIOR OF SKIN: Status: ACTIVE | Noted: 2025-08-08

## 2025-08-11 ENCOUNTER — PATIENT OUTREACH (OUTPATIENT)
Dept: CARE COORDINATION | Facility: CLINIC | Age: 65
End: 2025-08-11
Payer: COMMERCIAL

## 2025-08-11 DIAGNOSIS — Z12.11 COLON CANCER SCREENING: ICD-10-CM

## 2025-08-13 ENCOUNTER — PATIENT OUTREACH (OUTPATIENT)
Dept: CARE COORDINATION | Facility: CLINIC | Age: 65
End: 2025-08-13
Payer: COMMERCIAL

## 2025-08-14 ENCOUNTER — PATIENT OUTREACH (OUTPATIENT)
Dept: CARE COORDINATION | Facility: CLINIC | Age: 65
End: 2025-08-14
Payer: COMMERCIAL

## 2025-08-25 ENCOUNTER — ALLIED HEALTH/NURSE VISIT (OUTPATIENT)
Dept: FAMILY MEDICINE | Facility: CLINIC | Age: 65
End: 2025-08-25
Payer: COMMERCIAL

## 2025-08-25 DIAGNOSIS — Z00.00 ENCOUNTER FOR MEDICARE ANNUAL WELLNESS EXAM: Primary | ICD-10-CM

## 2025-08-25 PROCEDURE — 99207 PR NO CHARGE NURSE ONLY: CPT
